# Patient Record
Sex: FEMALE | Race: WHITE | NOT HISPANIC OR LATINO | Employment: OTHER | URBAN - METROPOLITAN AREA
[De-identification: names, ages, dates, MRNs, and addresses within clinical notes are randomized per-mention and may not be internally consistent; named-entity substitution may affect disease eponyms.]

---

## 2018-01-10 ENCOUNTER — HOSPITAL ENCOUNTER (OUTPATIENT)
Facility: MEDICAL CENTER | Age: 83
End: 2018-01-12
Attending: EMERGENCY MEDICINE | Admitting: INTERNAL MEDICINE
Payer: COMMERCIAL

## 2018-01-10 ENCOUNTER — APPOINTMENT (OUTPATIENT)
Dept: RADIOLOGY | Facility: MEDICAL CENTER | Age: 83
End: 2018-01-10
Attending: EMERGENCY MEDICINE
Payer: COMMERCIAL

## 2018-01-10 DIAGNOSIS — F03.90 DEMENTIA WITHOUT BEHAVIORAL DISTURBANCE, UNSPECIFIED DEMENTIA TYPE: ICD-10-CM

## 2018-01-10 DIAGNOSIS — R53.1 WEAKNESS: ICD-10-CM

## 2018-01-10 DIAGNOSIS — I48.21 PERMANENT ATRIAL FIBRILLATION (HCC): ICD-10-CM
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
## 2018-01-10 DIAGNOSIS — R42 VERTIGO: ICD-10-CM

## 2018-01-10 DIAGNOSIS — R53.1 GENERALIZED WEAKNESS: ICD-10-CM

## 2018-01-10 LAB
ALBUMIN SERPL BCP-MCNC: 3.6 G/DL (ref 3.2–4.9)
ALBUMIN/GLOB SERPL: 1 G/DL
ALP SERPL-CCNC: 84 U/L (ref 30–99)
ALT SERPL-CCNC: 16 U/L (ref 2–50)
ANION GAP SERPL CALC-SCNC: 11 MMOL/L (ref 0–11.9)
AST SERPL-CCNC: 33 U/L (ref 12–45)
BASOPHILS # BLD AUTO: 0.7 % (ref 0–1.8)
BASOPHILS # BLD: 0.03 K/UL (ref 0–0.12)
BILIRUB SERPL-MCNC: 0.5 MG/DL (ref 0.1–1.5)
BUN SERPL-MCNC: 18 MG/DL (ref 8–22)
CALCIUM SERPL-MCNC: 9 MG/DL (ref 8.5–10.5)
CHLORIDE SERPL-SCNC: 104 MMOL/L (ref 96–112)
CO2 SERPL-SCNC: 20 MMOL/L (ref 20–33)
CREAT SERPL-MCNC: 0.72 MG/DL (ref 0.5–1.4)
EOSINOPHIL # BLD AUTO: 0 K/UL (ref 0–0.51)
EOSINOPHIL NFR BLD: 0 % (ref 0–6.9)
ERYTHROCYTE [DISTWIDTH] IN BLOOD BY AUTOMATED COUNT: 45.6 FL (ref 35.9–50)
GLOBULIN SER CALC-MCNC: 3.6 G/DL (ref 1.9–3.5)
GLUCOSE SERPL-MCNC: 98 MG/DL (ref 65–99)
HCT VFR BLD AUTO: 45.9 % (ref 37–47)
HGB BLD-MCNC: 15.4 G/DL (ref 12–16)
IMM GRANULOCYTES # BLD AUTO: 0.01 K/UL (ref 0–0.11)
IMM GRANULOCYTES NFR BLD AUTO: 0.2 % (ref 0–0.9)
LIPASE SERPL-CCNC: 28 U/L (ref 11–82)
LYMPHOCYTES # BLD AUTO: 0.72 K/UL (ref 1–4.8)
LYMPHOCYTES NFR BLD: 17.4 % (ref 22–41)
MCH RBC QN AUTO: 30.7 PG (ref 27–33)
MCHC RBC AUTO-ENTMCNC: 33.6 G/DL (ref 33.6–35)
MCV RBC AUTO: 91.4 FL (ref 81.4–97.8)
MONOCYTES # BLD AUTO: 0.77 K/UL (ref 0–0.85)
MONOCYTES NFR BLD AUTO: 18.6 % (ref 0–13.4)
NEUTROPHILS # BLD AUTO: 2.6 K/UL (ref 2–7.15)
NEUTROPHILS NFR BLD: 63.1 % (ref 44–72)
NRBC # BLD AUTO: 0 K/UL
NRBC BLD-RTO: 0 /100 WBC
PLATELET # BLD AUTO: 172 K/UL (ref 164–446)
PMV BLD AUTO: 11.3 FL (ref 9–12.9)
POTASSIUM SERPL-SCNC: 4.1 MMOL/L (ref 3.6–5.5)
PROT SERPL-MCNC: 7.2 G/DL (ref 6–8.2)
RBC # BLD AUTO: 5.02 M/UL (ref 4.2–5.4)
SODIUM SERPL-SCNC: 135 MMOL/L (ref 135–145)
WBC # BLD AUTO: 4.1 K/UL (ref 4.8–10.8)

## 2018-01-10 PROCEDURE — 83690 ASSAY OF LIPASE: CPT

## 2018-01-10 PROCEDURE — 85025 COMPLETE CBC W/AUTO DIFF WBC: CPT

## 2018-01-10 PROCEDURE — 84484 ASSAY OF TROPONIN QUANT: CPT

## 2018-01-10 PROCEDURE — 93005 ELECTROCARDIOGRAM TRACING: CPT | Performed by: EMERGENCY MEDICINE

## 2018-01-10 PROCEDURE — 80053 COMPREHEN METABOLIC PANEL: CPT

## 2018-01-10 PROCEDURE — 99285 EMERGENCY DEPT VISIT HI MDM: CPT

## 2018-01-10 PROCEDURE — 84443 ASSAY THYROID STIM HORMONE: CPT

## 2018-01-10 PROCEDURE — 83735 ASSAY OF MAGNESIUM: CPT

## 2018-01-10 PROCEDURE — 83880 ASSAY OF NATRIURETIC PEPTIDE: CPT

## 2018-01-10 ASSESSMENT — PAIN SCALES - GENERAL: PAINLEVEL_OUTOF10: 9

## 2018-01-11 ENCOUNTER — APPOINTMENT (OUTPATIENT)
Dept: RADIOLOGY | Facility: MEDICAL CENTER | Age: 83
End: 2018-01-11
Attending: EMERGENCY MEDICINE
Payer: COMMERCIAL

## 2018-01-11 ENCOUNTER — RESOLUTE PROFESSIONAL BILLING HOSPITAL PROF FEE (OUTPATIENT)
Dept: HOSPITALIST | Facility: MEDICAL CENTER | Age: 83
End: 2018-01-11
Payer: COMMERCIAL

## 2018-01-11 PROBLEM — H55.00 NYSTAGMUS: Status: ACTIVE | Noted: 2018-01-11
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
## 2018-01-11 PROBLEM — I48.20 CHRONIC ATRIAL FIBRILLATION (HCC): Status: ACTIVE | Noted: 2018-01-11

## 2018-01-11 PROBLEM — R82.71 ASYMPTOMATIC BACTERIURIA: Status: ACTIVE | Noted: 2018-01-11

## 2018-01-11 PROBLEM — R42 DIZZINESS: Status: ACTIVE | Noted: 2018-01-11
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
## 2018-01-11 PROBLEM — I48.20 CHRONIC ATRIAL FIBRILLATION (HCC): Chronic | Status: ACTIVE | Noted: 2018-01-11

## 2018-01-11 PROBLEM — R29.6 MULTIPLE FALLS: Status: ACTIVE | Noted: 2018-01-11

## 2018-01-11 PROBLEM — F03.90 DEMENTIA (HCC): Status: ACTIVE | Noted: 2018-01-11

## 2018-01-11 PROBLEM — R53.1 GENERALIZED WEAKNESS: Status: ACTIVE | Noted: 2018-01-11

## 2018-01-11 LAB
APPEARANCE UR: ABNORMAL
BACTERIA #/AREA URNS HPF: ABNORMAL /HPF
BILIRUB UR QL STRIP.AUTO: NEGATIVE
BNP SERPL-MCNC: 171 PG/ML (ref 0–100)
COLOR UR: ABNORMAL
EPI CELLS #/AREA URNS HPF: ABNORMAL /HPF
GLUCOSE UR STRIP.AUTO-MCNC: NEGATIVE MG/DL
HYALINE CASTS #/AREA URNS LPF: ABNORMAL /LPF
KETONES UR STRIP.AUTO-MCNC: 15 MG/DL
LEUKOCYTE ESTERASE UR QL STRIP.AUTO: ABNORMAL
MAGNESIUM SERPL-MCNC: 2 MG/DL (ref 1.5–2.5)
MICRO URNS: ABNORMAL
MUCOUS THREADS #/AREA URNS HPF: ABNORMAL /HPF
NITRITE UR QL STRIP.AUTO: NEGATIVE
PH UR STRIP.AUTO: 5 [PH]
PROT UR QL STRIP: NEGATIVE MG/DL
RBC # URNS HPF: ABNORMAL /HPF
RBC UR QL AUTO: NEGATIVE
SP GR UR STRIP.AUTO: 1.03
TROPONIN I SERPL-MCNC: 0.02 NG/ML (ref 0–0.04)
TSH SERPL DL<=0.005 MIU/L-ACNC: 1.97 UIU/ML (ref 0.38–5.33)
UROBILINOGEN UR STRIP.AUTO-MCNC: 0.2 MG/DL
WBC #/AREA URNS HPF: ABNORMAL /HPF

## 2018-01-11 PROCEDURE — 700101 HCHG RX REV CODE 250: Performed by: STUDENT IN AN ORGANIZED HEALTH CARE EDUCATION/TRAINING PROGRAM

## 2018-01-11 PROCEDURE — 700105 HCHG RX REV CODE 258: Performed by: STUDENT IN AN ORGANIZED HEALTH CARE EDUCATION/TRAINING PROGRAM

## 2018-01-11 PROCEDURE — 36415 COLL VENOUS BLD VENIPUNCTURE: CPT

## 2018-01-11 PROCEDURE — 94640 AIRWAY INHALATION TREATMENT: CPT

## 2018-01-11 PROCEDURE — 97162 PT EVAL MOD COMPLEX 30 MIN: CPT

## 2018-01-11 PROCEDURE — 71045 X-RAY EXAM CHEST 1 VIEW: CPT

## 2018-01-11 PROCEDURE — G0378 HOSPITAL OBSERVATION PER HR: HCPCS

## 2018-01-11 PROCEDURE — 94760 N-INVAS EAR/PLS OXIMETRY 1: CPT

## 2018-01-11 PROCEDURE — 70450 CT HEAD/BRAIN W/O DYE: CPT

## 2018-01-11 PROCEDURE — 700102 HCHG RX REV CODE 250 W/ 637 OVERRIDE(OP): Performed by: STUDENT IN AN ORGANIZED HEALTH CARE EDUCATION/TRAINING PROGRAM

## 2018-01-11 PROCEDURE — 700111 HCHG RX REV CODE 636 W/ 250 OVERRIDE (IP): Performed by: STUDENT IN AN ORGANIZED HEALTH CARE EDUCATION/TRAINING PROGRAM

## 2018-01-11 PROCEDURE — G8978 MOBILITY CURRENT STATUS: HCPCS | Mod: CJ

## 2018-01-11 PROCEDURE — A9270 NON-COVERED ITEM OR SERVICE: HCPCS | Performed by: STUDENT IN AN ORGANIZED HEALTH CARE EDUCATION/TRAINING PROGRAM

## 2018-01-11 PROCEDURE — G8979 MOBILITY GOAL STATUS: HCPCS | Mod: CI

## 2018-01-11 PROCEDURE — 81001 URINALYSIS AUTO W/SCOPE: CPT

## 2018-01-11 PROCEDURE — 99220 PR INITIAL OBSERVATION CARE,LEVL III: CPT | Mod: GC | Performed by: INTERNAL MEDICINE

## 2018-01-11 RX ORDER — ACETAMINOPHEN 325 MG/1
650 TABLET ORAL EVERY 6 HOURS PRN
Status: DISCONTINUED | OUTPATIENT
Start: 2018-01-11 | End: 2018-01-12 | Stop reason: HOSPADM

## 2018-01-11 RX ORDER — SODIUM CHLORIDE 9 MG/ML
INJECTION, SOLUTION INTRAVENOUS CONTINUOUS
Status: DISCONTINUED | OUTPATIENT
Start: 2018-01-11 | End: 2018-01-12

## 2018-01-11 RX ORDER — AMOXICILLIN 250 MG
2 CAPSULE ORAL 2 TIMES DAILY
Status: DISCONTINUED | OUTPATIENT
Start: 2018-01-11 | End: 2018-01-12 | Stop reason: HOSPADM

## 2018-01-11 RX ORDER — BISACODYL 10 MG
10 SUPPOSITORY, RECTAL RECTAL
Status: DISCONTINUED | OUTPATIENT
Start: 2018-01-11 | End: 2018-01-12 | Stop reason: HOSPADM

## 2018-01-11 RX ORDER — IPRATROPIUM BROMIDE AND ALBUTEROL SULFATE 2.5; .5 MG/3ML; MG/3ML
3 SOLUTION RESPIRATORY (INHALATION)
Status: DISCONTINUED | OUTPATIENT
Start: 2018-01-11 | End: 2018-01-12

## 2018-01-11 RX ORDER — ONDANSETRON 4 MG/1
4 TABLET, ORALLY DISINTEGRATING ORAL EVERY 4 HOURS PRN
Status: DISCONTINUED | OUTPATIENT
Start: 2018-01-11 | End: 2018-01-12 | Stop reason: HOSPADM

## 2018-01-11 RX ORDER — ASPIRIN 81 MG/1
324 TABLET, CHEWABLE ORAL DAILY
Status: DISCONTINUED | OUTPATIENT
Start: 2018-01-11 | End: 2018-01-12 | Stop reason: HOSPADM

## 2018-01-11 RX ORDER — POLYETHYLENE GLYCOL 3350 17 G/17G
1 POWDER, FOR SOLUTION ORAL
Status: DISCONTINUED | OUTPATIENT
Start: 2018-01-11 | End: 2018-01-12 | Stop reason: HOSPADM

## 2018-01-11 RX ORDER — POLYETHYLENE GLYCOL 3350 17 G/17G
1 POWDER, FOR SOLUTION ORAL ONCE
Status: DISPENSED | OUTPATIENT
Start: 2018-01-11 | End: 2018-01-12

## 2018-01-11 RX ORDER — ASPIRIN 81 MG/1
81 TABLET, CHEWABLE ORAL DAILY
Status: DISCONTINUED | OUTPATIENT
Start: 2018-01-11 | End: 2018-01-11

## 2018-01-11 RX ADMIN — ONDANSETRON 4 MG: 4 TABLET, ORALLY DISINTEGRATING ORAL at 20:34

## 2018-01-11 RX ADMIN — IPRATROPIUM BROMIDE AND ALBUTEROL SULFATE 3 ML: .5; 3 SOLUTION RESPIRATORY (INHALATION) at 20:00

## 2018-01-11 RX ADMIN — ACETAMINOPHEN 650 MG: 325 TABLET, FILM COATED ORAL at 20:33

## 2018-01-11 RX ADMIN — SODIUM CHLORIDE: 9 INJECTION, SOLUTION INTRAVENOUS at 04:53

## 2018-01-11 RX ADMIN — SODIUM CHLORIDE: 9 INJECTION, SOLUTION INTRAVENOUS at 22:11

## 2018-01-11 RX ADMIN — ASPIRIN 324 MG: 81 TABLET, CHEWABLE ORAL at 08:13

## 2018-01-11 ASSESSMENT — ENCOUNTER SYMPTOMS
DOUBLE VISION: 0
DEPRESSION: 0
BACK PAIN: 0
DIZZINESS: 0
BLURRED VISION: 0
CHILLS: 0
ABDOMINAL PAIN: 0
SHORTNESS OF BREATH: 0
FEVER: 0
COUGH: 1
SPUTUM PRODUCTION: 1
NAUSEA: 0
FOCAL WEAKNESS: 0
SHORTNESS OF BREATH: 1
DIZZINESS: 1
VOMITING: 0
HEADACHES: 0
STRIDOR: 0
PHOTOPHOBIA: 0
NERVOUS/ANXIOUS: 0
SORE THROAT: 0
MEMORY LOSS: 1
PALPITATIONS: 0
NECK PAIN: 0
MYALGIAS: 0
COUGH: 0
WEAKNESS: 0
HEMOPTYSIS: 0
FALLS: 1
DIARRHEA: 0
SPUTUM PRODUCTION: 0
CONSTIPATION: 1

## 2018-01-11 ASSESSMENT — LIFESTYLE VARIABLES
HAVE YOU EVER FELT YOU SHOULD CUT DOWN ON YOUR DRINKING: NO
HAVE PEOPLE ANNOYED YOU BY CRITICIZING YOUR DRINKING: NO
EVER HAD A DRINK FIRST THING IN THE MORNING TO STEADY YOUR NERVES TO GET RID OF A HANGOVER: NO
ON A TYPICAL DAY WHEN YOU DRINK ALCOHOL HOW MANY DRINKS DO YOU HAVE: 1
AVERAGE NUMBER OF DAYS PER WEEK YOU HAVE A DRINK CONTAINING ALCOHOL: 0
TOTAL SCORE: 0
CONSUMPTION TOTAL: NEGATIVE
EVER_SMOKED: YES
TOTAL SCORE: 0
HOW MANY TIMES IN THE PAST YEAR HAVE YOU HAD 5 OR MORE DRINKS IN A DAY: 0
TOTAL SCORE: 0
ALCOHOL_USE: YES
EVER FELT BAD OR GUILTY ABOUT YOUR DRINKING: NO
EVER_SMOKED: YES

## 2018-01-11 ASSESSMENT — COPD QUESTIONNAIRES
COPD SCREENING SCORE: 4
DURING THE PAST 4 WEEKS HOW MUCH DID YOU FEEL SHORT OF BREATH: NONE/LITTLE OF THE TIME
DURING THE PAST 4 WEEKS HOW MUCH DID YOU FEEL SHORT OF BREATH: NONE/LITTLE OF THE TIME
HAVE YOU SMOKED AT LEAST 100 CIGARETTES IN YOUR ENTIRE LIFE: NO/DON'T KNOW
HAVE YOU SMOKED AT LEAST 100 CIGARETTES IN YOUR ENTIRE LIFE: YES
DO YOU EVER COUGH UP ANY MUCUS OR PHLEGM?: NO/ONLY WITH OCCASIONAL COLDS OR INFECTIONS
DO YOU EVER COUGH UP ANY MUCUS OR PHLEGM?: NO/ONLY WITH OCCASIONAL COLDS OR INFECTIONS
COPD SCREENING SCORE: 2

## 2018-01-11 ASSESSMENT — COGNITIVE AND FUNCTIONAL STATUS - GENERAL
MOBILITY SCORE: 16
STANDING UP FROM CHAIR USING ARMS: A LOT
MOVING FROM LYING ON BACK TO SITTING ON SIDE OF FLAT BED: A LITTLE
TURNING FROM BACK TO SIDE WHILE IN FLAT BAD: A LITTLE
MOVING TO AND FROM BED TO CHAIR: A LITTLE
SUGGESTED CMS G CODE MODIFIER MOBILITY: CK
CLIMB 3 TO 5 STEPS WITH RAILING: A LOT
WALKING IN HOSPITAL ROOM: A LITTLE

## 2018-01-11 ASSESSMENT — PATIENT HEALTH QUESTIONNAIRE - PHQ9
1. LITTLE INTEREST OR PLEASURE IN DOING THINGS: NOT AT ALL
SUM OF ALL RESPONSES TO PHQ QUESTIONS 1-9: 0
2. FEELING DOWN, DEPRESSED, IRRITABLE, OR HOPELESS: NOT AT ALL
SUM OF ALL RESPONSES TO PHQ9 QUESTIONS 1 AND 2: 0

## 2018-01-11 ASSESSMENT — GAIT ASSESSMENTS
DISTANCE (FEET): 65
ASSISTIVE DEVICE: FRONT WHEEL WALKER
GAIT LEVEL OF ASSIST: CONTACT GUARD ASSIST
DEVIATION: DECREASED BASE OF SUPPORT;STEP TO;DECREASED HEEL STRIKE;DECREASED TOE OFF

## 2018-01-11 ASSESSMENT — PAIN SCALES - GENERAL
PAINLEVEL_OUTOF10: 0
PAINLEVEL_OUTOF10: 0
PAINLEVEL_OUTOF10: 3

## 2018-01-11 NOTE — CARE PLAN
Problem: Safety  Goal: Will remain free from falls  Outcome: PROGRESSING AS EXPECTED    Intervention: Assess risk factors for falls  Safety education was given to patient.       Problem: Venous Thromboembolism (VTW)/Deep Vein Thrombosis (DVT) Prevention:  Goal: Patient will participate in Venous Thrombosis (VTE)/Deep Vein Thrombosis (DVT)Prevention Measures  Outcome: PROGRESSING SLOWER THAN EXPECTED    Intervention: Assess and monitor for anticoagulation complications  Nurse provided education about her anticoagulations and the risk of not taking medication. Patient cont to refuse meds.

## 2018-01-11 NOTE — ED NOTES
"Chief Complaint   Patient presents with   • Nausea/Vomiting/Diarrhea     \"I've been vomitting the last 2 days, my bowels keep moving. I hurt all over. I'm very dizzy and it's hard to walk. I'm so afraid of falling\". Denies fevers. Denies abd pain.    • Dizziness     Pt amb to triage with 2 person assist, unsteady. Placed in w/c. Reporting cough x1 week. Mask applied. Main concern is dizziness, \"I can't stop spinning\".   NAD noted. VSS. No emesis in triage. Emesis bag provided.   Pt returned to [facility]. Educated on triage process and to inform staff of any changes.     /74   Pulse 95   Temp 36.8 °C (98.2 °F) (Temporal)   Resp 16   Ht 1.676 m (5' 6\")   Wt 75.7 kg (166 lb 14.2 oz)   SpO2 94%   BMI 26.94 kg/m²     "

## 2018-01-11 NOTE — THERAPY
"Physical Therapy Evaluation completed.   Bed Mobility:  Supine to Sit: Minimal Assist  Transfers: Sit to Stand: Contact Guard Assist  Gait: Level Of Assist: Contact Guard Assist with Front-Wheel Walker       Plan of Care: Will benefit from Physical Therapy 3 times per week  Discharge Recommendations: Equipment: Front-Wheel Walker. Post-acute therapy Discharge to a transitional care facility for continued skilled therapy services. and Discharge to home with outpatient or home health for additional skilled therapy services.    See \"Rehab Therapy-Acute\" Patient Summary Report for complete documentation.     Pt. presented to PT for primary risk reduction for LOB/falling. Pt. presented with imparied balance, imparied gait, decreased strength, poor saftey awareness, and dec activity tolerance. Pt. was able to demonstrate Min A for bed mobility with HOB flat and rails down, CGA for ambulation w/FWW. Pt. demonstrates with an unsteady gait pattern and tends to do worse when ambulating and talking at the same time. Requires focus/attention for balance. Demonstrates with poor saftey awareness as pt. tends to leave AD behind and perfrom furniture walking for transfers. Pt. will benefit from continued skilled PT while in house. Recommend post acute rehab given current objective findings, age, IPLOF, enviornemental barries, and limited social support at this time. However, if pt. makes signifcant progress in balance and gait mechanics while in house pt. may d/c back to assisted living facility if medically approrpiate.   "

## 2018-01-11 NOTE — SENIOR ADMIT NOTE
Ms. yLons is a 86 y.o. female with PMHx of afib on ASA who was brought in to the ER by her son for an episode of nausea, vomiting, and diarrhea that occurred Monday morning.  She had gone out to dinner with her son on Sunday, and on Monday called him because she threw up once and had one large bowel movement that was not as firm as it usually is, though patient adamantly denies this was diarrhea.  She denies any symptoms since then and is tolerating po intake normally now.  She denies fever, chills, abdominal pain, dysuria, chest pain, palpitations, dyspnea.  She does report that she is worried about this dizziness that has started over the past month or so.  She feels that the dizziness occurs usually when she moves from sitting to standing position, and if she stands still for a few seconds it gets a little better but does not resolve completely.  She does not have this while lying down at all.  She uses a walking stick to ambulate and is usually very careful.  She denies any weakness.  She has had 2 mechanical falls in the past month or so, one when she reached down to pet a dog and lost her  on her cane, and the other when she accidentally missed the bed as she was trying to sit on the edge of it.  She has a little residual R shoulder pain from the initial fall a month ago, but no limitation of motion.  She still has a little bruising over her L arm and chest from the most recent fall last week, but states this has improved significantly as well.  She states she is usually very careful and does not recall having had any other falls in the past year.  She is a little concerned that she might have a fall with this dizziness however.  She drinks about one small bottle of water per day.  Her only med is an ASA daily for her Afib.  She has not had a PCP since she retired but is interested in getting a new geriatrician (though she would like it to be a female who is older than 50).  She lives in a place where  "they make meals for her and she does not have to climb any stairs in her house.    Exam:  /74   Pulse 79   Temp 36.8 °C (98.2 °F) (Temporal)   Resp (!) 21   Ht 1.676 m (5' 6\")   Wt 75.7 kg (166 lb 14.2 oz)   SpO2 91%   BMI 26.94 kg/m²     - no acute distress  - alert and oriented; strength 5/5 in all extremities; cerebellar function intact; cranial nerves 2-12 grossly intact; + mild horizontal nystagmus in both eyes with lateral gaze  - patient answers all questions appropriately and is able to provide a thorough and accurate history  - irregular rhythm, normal rate; no m/r/g  - CTABL, no rales/ronchi  - orthostatic vitals negative  - abd soft, nontender, BS+  - skin intact; + bruising of skin over L triceps and L chest wall    Labs:  Recent Results (from the past 24 hour(s))   CBC WITH DIFFERENTIAL    Collection Time: 01/10/18  9:48 PM   Result Value Ref Range    WBC 4.1 (L) 4.8 - 10.8 K/uL    RBC 5.02 4.20 - 5.40 M/uL    Hemoglobin 15.4 12.0 - 16.0 g/dL    Hematocrit 45.9 37.0 - 47.0 %    MCV 91.4 81.4 - 97.8 fL    MCH 30.7 27.0 - 33.0 pg    MCHC 33.6 33.6 - 35.0 g/dL    RDW 45.6 35.9 - 50.0 fL    Platelet Count 172 164 - 446 K/uL    MPV 11.3 9.0 - 12.9 fL    Neutrophils-Polys 63.10 44.00 - 72.00 %    Lymphocytes 17.40 (L) 22.00 - 41.00 %    Monocytes 18.60 (H) 0.00 - 13.40 %    Eosinophils 0.00 0.00 - 6.90 %    Basophils 0.70 0.00 - 1.80 %    Immature Granulocytes 0.20 0.00 - 0.90 %    Nucleated RBC 0.00 /100 WBC    Neutrophils (Absolute) 2.60 2.00 - 7.15 K/uL    Lymphs (Absolute) 0.72 (L) 1.00 - 4.80 K/uL    Monos (Absolute) 0.77 0.00 - 0.85 K/uL    Eos (Absolute) 0.00 0.00 - 0.51 K/uL    Baso (Absolute) 0.03 0.00 - 0.12 K/uL    Immature Granulocytes (abs) 0.01 0.00 - 0.11 K/uL    NRBC (Absolute) 0.00 K/uL   COMP METABOLIC PANEL    Collection Time: 01/10/18  9:48 PM   Result Value Ref Range    Sodium 135 135 - 145 mmol/L    Potassium 4.1 3.6 - 5.5 mmol/L    Chloride 104 96 - 112 mmol/L    Co2 20 20 " - 33 mmol/L    Anion Gap 11.0 0.0 - 11.9    Glucose 98 65 - 99 mg/dL    Bun 18 8 - 22 mg/dL    Creatinine 0.72 0.50 - 1.40 mg/dL    Calcium 9.0 8.5 - 10.5 mg/dL    AST(SGOT) 33 12 - 45 U/L    ALT(SGPT) 16 2 - 50 U/L    Alkaline Phosphatase 84 30 - 99 U/L    Total Bilirubin 0.5 0.1 - 1.5 mg/dL    Albumin 3.6 3.2 - 4.9 g/dL    Total Protein 7.2 6.0 - 8.2 g/dL    Globulin 3.6 (H) 1.9 - 3.5 g/dL    A-G Ratio 1.0 g/dL   LIPASE    Collection Time: 01/10/18  9:48 PM   Result Value Ref Range    Lipase 28 11 - 82 U/L   ESTIMATED GFR    Collection Time: 01/10/18  9:48 PM   Result Value Ref Range    GFR If African American >60 >60 mL/min/1.73 m 2    GFR If Non African American >60 >60 mL/min/1.73 m 2   TSH    Collection Time: 01/10/18  9:48 PM   Result Value Ref Range    TSH 1.970 0.380 - 5.330 uIU/mL   BNP    Collection Time: 01/10/18  9:48 PM   Result Value Ref Range    B Natriuretic Peptide 171 (H) 0 - 100 pg/mL   EKG    Collection Time: 01/10/18 11:53 PM   Result Value Ref Range    Report       [facility].    Test Date:  2018-01-10  Pt Name:    ORTIZ GARCIA                  Department: ER  MRN:        [id number]                      Room:       [facility]  Gender:     Female                       Technician: [id number]  :        [id number]                   Requested By:PRAKASH MCKOY  Order #:    [id number]                    Reading MD:    Measurements  Intervals                                Axis  Rate:       80                           P:  UT:                                      QRS:        33  QRSD:       76                           T:          8  QT:         392  QTc:        453    Interpretive Statements  ATRIAL FIBRILLATION  VENTRICULAR PREMATURE COMPLEX  Compared to ECG 2010 23:55:26  Ventricular premature complex(es) now present         CT-HEAD W/O   Final Result         1. No acute intracranial findings. No evidence of acute intracranial hemorrhage or mass lesion.      2. White  matter lucencies most consistent with chronic small vessel ischemic change.               DX-CHEST-LIMITED (1 VIEW)   Final Result         Mild diffuse interstitial prominence could relate to chronic changes or mild fluid overload.      Stable cardiomegaly.          Assessment and Plan:    DIZZINESS WITH NYSTAGMUS  GROUND LEVEL FALLS  DEHYDRATION  ASYMPTOMATIC BACTERURIA  CHRONIC ATRIAL FIBRILLATION  - ER workup revealed normal CBC, CMP, Mg, lipase, trop, and TSH  - CXR clear, CT head shows no acute abnormality; EGK shows rate-controlled afib  - BNP mildly elevated, but patient otherwise has no e/o fluid overload  - UA shows e/o dehydration (ketones, hyaline casts) and shows moderate LE with  wbc, but patient denies any UTI symptoms  - she does have mild nystagmus on exam  - orthostatic vitals neg, but symptoms seem orthostatic per history  - will admit to obs for IVF hydration  - have encouraged patient to maintain better hydration at home and stand still for some time after getting up before ambulating  - will get PT/OT eval to r/o BPPV while inpatient (though less likely as symptoms done appear to be vetiginous) and suggest d/c with home health PT/OT for home safety assessment and strengthening exercises  - if symptoms still persist, can consider MRI brain to r/o cerebellar stroke, but seems unlikely; also patient has been having these symptoms for weeks now so any infarct should be visible on CT head, which was clear  - discussed OAC with patient due to chadsvasc score of 3; she seems agreeable, but for now will continue  until this can be discussed once again with son present and after fall risk assessment by PT/OT  - suggest providing patient info for [facility] on discharge so she can find a geriatrician to establish with    For further details of Assessment & Plan, please refer to H&P by Dr. Munoz from 1/11/2018.    CODE STATUS: full  DVT PROPHYLAXIS: jurgenx    Whit Torres  MD

## 2018-01-11 NOTE — ED PROVIDER NOTES
"ED Provider Note    ER PROVIDER NOTE        CHIEF COMPLAINT  Chief Complaint   Patient presents with   • Nausea/Vomiting/Diarrhea     \"I've been vomitting the last 2 days, my bowels keep moving. I hurt all over. I'm very dizzy and it's hard to walk. I'm so afraid of falling\". Denies fevers. Denies abd pain.    • Dizziness       HPI  Sindy Lyons is a 86 y.o. female who presents to the emergency department complaining of weakness and dizziness. Patient reports she's had some generalized weakness although this is over several months, does seem to be worse in the last week and is now reporting some dizziness. She's describes the dizziness which she is a hard time qualifying, somewhere between lightheadedness and a sensation of off balance.  She has had several falls in the last month as well. Yesterday she went to the bathroom, felt nauseated had an episode of emesis as well as diarrhea although she has not had any today. She does endorse some urinary frequency as well over the last week.  Denies any fevers or chills. No chest pain or cough. Has not seen a doctor in many years    REVIEW OF SYSTEMS  Pertinent positives include dizziness. Pertinent negatives include no fever. See HPI for details. All other systems reviewed and are negative.    PAST MEDICAL HISTORY   has a past medical history of Afib (CMS-HCC) and CATARACT.    SURGICAL HISTORY   has a past surgical history that includes other abdominal surgery; gyn surgery; and other.    FAMILY HISTORY  History reviewed. No pertinent family history.    SOCIAL HISTORY  Social History     Social History   • Marital status: Single     Spouse name: N/A   • Number of children: N/A   • Years of education: N/A     Social History Main Topics   • Smoking status: Former Smoker     Years: 5.00   • Smokeless tobacco: Former User      Comment: 25 years ago   • Alcohol use No   • Drug use: No   • Sexual activity: Not on file     Other Topics Concern   • Not on file     Social History " "Narrative    Resides in assisted living facility.       History   Drug Use No       CURRENT MEDICATIONS  Home Medications    **Home medications have not yet been reviewed for this encounter**         ALLERGIES  No Known Allergies    PHYSICAL EXAM  VITAL SIGNS: /74   Pulse 71   Temp 36.8 °C (98.2 °F) (Temporal)   Resp 20   Ht 1.676 m (5' 6\")   Wt 75.7 kg (166 lb 14.2 oz)   SpO2 93%   BMI 26.94 kg/m²   Pulse ox interpretation: I interpret this pulse ox as normal.    Constitutional: Alert in no apparent distress.  HENT: No signs of trauma, Bilateral external ears normal, Nose normal.   Eyes: Pupils are equal and reactive, Conjunctiva normal, Non-icteric.   Neck: Normal range of motion, No tenderness, Supple, No stridor.   Lymphatic: No lymphadenopathy noted.   Cardiovascular: Regular rate and rhythm, no murmurs.   Thorax & Lungs: Normal breath sounds, No respiratory distress, No wheezing, No chest tenderness.   Abdomen: Bowel sounds normal, Soft, No tenderness, No masses, No pulsatile masses. No peritoneal signs.  Skin: Warm, Dry, No erythema, No rash.   Back: No bony tenderness, No CVA tenderness.   Extremities: Intact distal pulses, No edema, No tenderness, No cyanosis, Negative John's sign.  Musculoskeletal: Good range of motion in all major joints. No tenderness to palpation or major deformities noted.   Neurologic: Alert, bilateral horizontal nystagmus cranial nerves intact, speech is appropriate or not slurred, upper extremities bilaterally exhibit no drift, no dysmetria, 5 out of 5 strength with bilateral bicep/tricep/, sensation intact to light touch throughout upper extremities. Lower extremities strength 5 out of 5 thigh extension/flexion/abduction/adduction, knee extension/flexion, dorsiflexion plantar flexion. No clonus.  2+ patella reflexes.  sensation intact to light touch.  No focal deficits noted. Unsteady gait, somewhat repetitive in questioning  Psychiatric: Affect normal, Judgment " normal, Mood normal.        DIAGNOSTIC STUDIES / PROCEDURES    EKG  Interpreted by me    Rhythm: Atrial fibrillation  Rate: 80  Axis: normal  Intervals: normal  Ectopy: Isolated PVC   Conduction: normal  ST Segments: no acute change  T Waves: no acute change  Q Waves: none      LABS  Labs Reviewed   CBC WITH DIFFERENTIAL - Abnormal; Notable for the following:        Result Value    WBC 4.1 (*)     Lymphocytes 17.40 (*)     Monocytes 18.60 (*)     Lymphs (Absolute) 0.72 (*)     All other components within normal limits   COMP METABOLIC PANEL - Abnormal; Notable for the following:     Globulin 3.6 (*)     All other components within normal limits   BTYPE NATRIURETIC PEPTIDE - Abnormal; Notable for the following:     B Natriuretic Peptide 171 (*)     All other components within normal limits   LIPASE   ESTIMATED GFR   TROPONIN   TSH   MAGNESIUM   URINALYSIS   POC URINALYSIS       All labs reviewed by me.    RADIOLOGY  CT-HEAD W/O   Final Result         1. No acute intracranial findings. No evidence of acute intracranial hemorrhage or mass lesion.      2. White matter lucencies most consistent with chronic small vessel ischemic change.               DX-CHEST-LIMITED (1 VIEW)   Final Result         Mild diffuse interstitial prominence could relate to chronic changes or mild fluid overload.      Stable cardiomegaly.        The radiologist's interpretation of all radiological studies have been reviewed by me.    COURSE & MEDICAL DECISION MAKING  Nursing notes, VS, PMSFHx reviewed in chart.    11:13 PM Patient seen and examined at bedside. . Ordered for labs, CT, ECG and urinalysis to evaluate her symptoms.     Patient reevaluated, updated on result thus far, she states she has no symptoms at this time    Patient reevaluated, updated on results of ECG as well as CT. The major symptoms and lack of anticoagulation with her age of fibrillation explained concerns and planned admission    Discuss case with [facility] for  admission    Decision Making:  This is a 86 y.o. female presented with some vertiginous symptoms as well as nystagmus. Unclear exact etiology for her symptoms although given her age, nature of his symptoms as well as the fact that she is in atrial fibrillation without anticoagulation do have some concerns for ischemic disease.  She has not seen a doctor in many years and thus I do not feel she is appropriate for outpatient workup of this.  Additionally as she has had multiple falls she may benefit from physical therapy and occupational therapy as well    Patient admitted in stable condition    FINAL IMPRESSION  1. Permanent atrial fibrillation (CMS-HCC)    2. Vertigo    3. Weakness         The note accurately reflects work and decisions made by me.  Chuckie Perry  1/11/2018  3:36 AM

## 2018-01-11 NOTE — DISCHARGE PLANNING
Clinical Note:  This 86 year old female admitted yesterday, 1/10, from the ER with c/o dizziness with standing and weakness.  She had had some nausea, vomiting and diarrhea but that has gone away. She lives at the [facility], here in [place]. She has a son, Nir Luevano ([phone]), who lives in the area.  She is curious to find out why she was dizzy.  Otherwise she said she is feeling somewhat better.  Her discharge plan is to return to the [facility] when she is medically cleared.  She has been independent and has her own apartment there.

## 2018-01-11 NOTE — NON-PROVIDER
Internal Medicine Medical Student Note  Note Author: Luda Teran, Student    Name Sindy Lyons       1931   Age/Sex 86 y.o. female   MRN [id number]   Code Status Full     After 5PM or if no immediate response to page, please call for cross-coverage  Attending/Team: Dr. Kirk See Patient List for primary contact information  Call [phone] to page after hours   1st Call - Day Intern (R1):   Dr. De Leon 2nd Call - Day Sr. Resident (R2/R3):   Dr. Frost     Reason for interval visit  (Principal Problem)   Generalized weakness    Interval Problem Daily Status Update  (24 hours)   Sindy Lyons is a 86 y.o. female w/ hx of a-fib and dementia who presented w/ 1 episode of n/v and diarrhea with dizziness/weakness for one month. She also experienced 2 falls that seemed to be mechanical in the past wk and hurt her shoulder during these. Pt's ADL's include driving, walking w/ a cane, though her meals are prepared by the assisted living facility where she resides. Pt drinks one bottle of H2O per day. Pt was accompanied by her son who provided much of the hx, but he was not present when I interviewed her.    Today pt states that she does not remember being dizzy and does not know why she is in the hospital. She reports a current productive cough, mild dyspnea, and a feeling of being off balance. Pt's last bowel movement was 2 days ago, but she is passing flatus. Denies dysuria, urinary frequency, or urgency.    Review of Systems   Constitutional: Negative for chills and fever.   HENT: Negative for sore throat and tinnitus.    Eyes: Negative for blurred vision.   Respiratory: Positive for cough, sputum production and shortness of breath. Negative for hemoptysis and stridor.    Cardiovascular: Negative for chest pain and palpitations.   Gastrointestinal: Positive for constipation. Negative for abdominal pain, nausea and vomiting.   Genitourinary: Negative for dysuria, frequency and urgency.   Neurological:  Positive for dizziness.     Physical Exam     Vitals:    01/11/18 1143 01/11/18 1145 01/11/18 1146 01/11/18 1148   BP: 123/76 133/72  121/74   Pulse:   74    Resp:   16    Temp:       TempSrc:       SpO2:   95%    Weight:       Height:         Body mass index is 26.9 kg/m². Weight: 75.6 kg (166 lb 10.7 oz)  Oxygen Therapy:  Pulse Oximetry: 95 %, O2 (LPM): 0, O2 Delivery: Nasal Cannula    Physical Exam   Constitutional: She is well-developed, well-nourished, and in no distress. No distress.   HENT:   Head: Normocephalic and atraumatic.   Right Ear: External ear normal.   Left Ear: External ear normal.   Nose: Nose normal.   Mouth/Throat: Oropharynx is clear and moist. Mucous membranes are dry. No oropharyngeal exudate.   Eyes: Conjunctivae are normal. Pupils are equal, round, and reactive to light. Right eye exhibits no discharge. Left eye exhibits no discharge. Right conjunctiva is not injected. Right conjunctiva has no hemorrhage. Left conjunctiva is not injected. Left conjunctiva has no hemorrhage. No scleral icterus. Right eye exhibits nystagmus. Right eye exhibits normal extraocular motion. Left eye exhibits nystagmus. Left eye exhibits normal extraocular motion.   Horizontal nystagmus on lateral gaze b/l that is fatigable w/ rapid onset.  Torsional nystagmus on superior gaze b/l that is fatigable w/ rapid onset.   Neck: Normal range of motion. Neck supple. No JVD present. No tracheal deviation present.   Cardiovascular: Normal rate and intact distal pulses.  An irregular rhythm present. Exam reveals no gallop.    No murmur heard.  Pulmonary/Chest: Effort normal and breath sounds normal. No respiratory distress. She has no wheezes. She has no rales.   Abdominal: Soft. Bowel sounds are normal. She exhibits no distension. There is no tenderness. There is no rebound and no guarding.   Musculoskeletal: Normal range of motion. She exhibits no edema, tenderness or deformity.   Neurological: She is alert. She has  normal sensation, normal strength and intact cranial nerves. She is agitated and disoriented. She displays tremor. She displays no weakness, no atrophy, facial symmetry and normal speech. No cranial nerve deficit or sensory deficit. She exhibits normal muscle tone. She has a normal Cerebellar Exam and a normal Finger-Nose-Finger Test. She shows no pronator drift. Coordination normal. GCS score is 15.   Pt has a mild intention tremor when performing FNF, though she is able to accurately perform the test   Skin: Skin is warm and dry. She is not diaphoretic.   Unable to perform gait exam or jennifer-hallpike since pt became very lightheaded & distressed when sat up and immediately needed to lay back down.    Assessment/Plan   Sindy Lyons is a 86 y.o. female w/ hx of a-fib & dementia who presented w/ 1 episode of n/v and diarrhea w/ one month of dizziness.    # Dizziness/weakness  - Hx of dizziness, but pt denies being dizzy here so was unable to assess qualities of the dizziness  - Likely 2/2 dehydration (presyncopal) vs. Urosepsis vs. BPPV vs. CVA vs. Viral cochleitis or labarynthitis vs. Vestibular neuritis  - Pt had vomiting & diarrhea, which may have led to diarrhea and pt only drinks one bottle of H2O per day  - Worrisome causes of dizziness in elderly pt are urosepsis (since pt also has bacteruria) or CVA (since pt has a-fib and signs of ischemic disease on CT head)  - CT head did not show signs of acute ischemic or hemorrhagic event and pt does not show any focal neuro deficits, making CVA or other central causes of dizziness less likely  - Pt's severe symptoms, such as not being able to sit up w/o becoming dizzy, suggest more of a peripheral cause rather than central cause of her dizziness   Plan:   - PT/OT to further evaluate w/ jennifer-hallpike and gait assessments   - Cont IVF's   - Consider brain MRI if pt does not improve, but it is not indicated at this time   - F/u as outpt w/ geriatrician    # UTI  - UA showed  LE+, few bacteria, hyaline casts, ketones, though pt denies dysuria, urinary freq, or urgency  - Dizziness could be a symptom of a UTI in pt, but must also consider that the abn UA may be due to dehydration/ asymptomatic bacteruria and that the dizziness is unrelated   Plan:   - No antibiotics indicated at this time    # Dehydration  - Dry mucous membranes on exam  - Likely 2/2 decreased H2O intake, possibly 2/2 dementia. Recent n/v & diarrhea may have contributed as well.   Plan:   - Cont IVF's   - Encourage increased PO intake of H2O at home    # Ground level falls  - Pt had 2 falls that seemed to be mechanical in nature  - Likely 2/2 dizziness/weakness that pt is complaining of   Plan:   - PT/OT to assess. Consider a walker, rather than a cane, for added stability.   - Ensure that the pt's living facility has adequate tools to help pt stay free from falls    # Atrial fibrillation  - Chronic, not on anticoagulation at home, but does take ASA  - CHADSVAC=3, indicating need for long-term anticoagulation   Plan:   - Start NOAC to prevent stroke 2/2 embolus from a-fib    # Dementia  - Unsure of what pt's baseline cognitive functioning is, but pt was unsure of her situation, what brought her here, and that she was dizzy   Plan:   - F/u as outpt w/ geriatrician and continue at assisted living facility    Dispo: consider discharge later today if pt does well w/ PT/OT, otherwise keep for another night and reassess tomorrow

## 2018-01-11 NOTE — PROGRESS NOTES
2 RN skin check with Nazia. Generalized dry, flaky, loose skin with scattered moles. Redness to tops of ears, bruising to both arms, redness and excoration to umbilicus, redness under breasts

## 2018-01-11 NOTE — PROGRESS NOTES
Internal Medicine Interval Note  Note Author: Kathi Lindo M.D.     Name Sindy Lyons 1931   Age/Sex 86 y.o. female   MRN [id number]   Code Status Full Code      After 5PM or if no immediate response to page, please call for cross-coverage  Attending/Team: Dr. Kirk/Kaushal See Patient List for primary contact information  Call [phone] to page    1st Call - Day Intern (R1):   Dr. Lindo 2nd Call - Day Sr. Resident (R2/R3):   Dr. Flaherty      Reason for interval visit  (Principal Problem)   Generalized weakness    Interval Problem Daily Status Update  (24 hours)   - Patient stable, states she feels tired, denies lightheadedness/dizziness  - Noted to have nystagmus on physical exam  - MRI/MRA ordered  - PT/OT eval pending, RT protocol for wheezing  - Geriatrics on consult, appreciate rec's      Review of Systems   Constitutional: Negative for chills and fever.   HENT: Positive for congestion. Negative for ear pain, hearing loss and tinnitus.    Eyes: Negative for blurred vision and double vision.   Respiratory: Positive for cough. Negative for sputum production and shortness of breath.    Cardiovascular: Negative for chest pain, palpitations and leg swelling.   Gastrointestinal: Negative for abdominal pain, nausea and vomiting.   Genitourinary: Negative for dysuria and urgency.   Musculoskeletal: Positive for falls. Negative for myalgias and neck pain.   Skin: Negative for rash.   Neurological: Negative for dizziness, focal weakness, weakness and headaches.   Psychiatric/Behavioral: Negative for depression.       Consultants/Specialty  Geriatrics    Disposition  Inpatient    Quality Measures    Reviewed items::  EKG reviewed, Radiology images reviewed, Labs reviewed and Medications reviewed  Shepherd catheter::  No Shepherd  : No central line.  DVT prophylaxis pharmacological::  Enoxaparin (Lovenox)  DVT prophylaxis - mechanical:  SCDs  Ulcer Prophylaxis::  No      Physical Exam     Vitals:     01/11/18 1145 01/11/18 1146 01/11/18 1148 01/11/18 1553   BP: 133/72  121/74 151/70   Pulse:  74  84   Resp:  16  16   Temp:    37 °C (98.6 °F)   TempSrc:       SpO2:  95%  93%   Weight:       Height:         Body mass index is 26.9 kg/m². Weight: 75.6 kg (166 lb 10.7 oz)  Oxygen Therapy:  Pulse Oximetry: 93 %, O2 (LPM): 0, O2 Delivery: Nasal Cannula      Physical Exam   Constitutional: She is oriented to person, place, and time and well-developed, well-nourished, and in no distress.   HENT:   Head: Normocephalic and atraumatic.   Mouth/Throat: No oropharyngeal exudate.   Eyes: Conjunctivae are normal. Pupils are equal, round, and reactive to light. No scleral icterus.   Neck: Normal range of motion. Neck supple. No tracheal deviation present. No thyromegaly present.   Cardiovascular: Normal rate, regular rhythm and normal heart sounds.    Pulmonary/Chest: Effort normal. No respiratory distress. She has wheezes.   Abdominal: Soft. Bowel sounds are normal. She exhibits no distension. There is no tenderness.   Musculoskeletal: Normal range of motion.   Neurological: She is alert and oriented to person, place, and time. No cranial nerve deficit.   Lateral nystagmus noted, fatigability present   Skin: No rash noted. No erythema.   Psychiatric: Affect normal.         Lab Data Review:   1/11/2018  2:42 PM    Recent Labs      01/10/18   0001  01/10/18   2148   SODIUM   --   135   POTASSIUM   --   4.1   CHLORIDE   --   104   CO2   --   20   BUN   --   18   CREATININE   --   0.72   MAGNESIUM  2.0   --    CALCIUM   --   9.0       Recent Labs      01/10/18   2148   ALTSGPT  16   ASTSGOT  33   ALKPHOSPHAT  84   TBILIRUBIN  0.5   LIPASE  28   GLUCOSE  98       Recent Labs      01/10/18   2148   RBC  5.02   HEMOGLOBIN  15.4   HEMATOCRIT  45.9   PLATELETCT  172       Recent Labs      01/10/18   2148   WBC  4.1*   NEUTSPOLYS  63.10   LYMPHOCYTES  17.40*   MONOCYTES  18.60*   EOSINOPHILS  0.00   BASOPHILS  0.70   ASTSGOT  33    ALTSGPT  16   ALKPHOSPHAT  84   TBILIRUBIN  0.5         Assessment/Plan     * Generalized weakness- (present on admission)   Assessment & Plan    - likely due to deconditioning, poor PO intake   - UA suggestive of infection, however denies urinary symptoms    - orthostatics negative  - IVF  - PT/OT        Nystagmus- (present on admission)   Assessment & Plan    - Patient noted to have nystagmus on PE, fatigability noted  - No other focal neurological deficits noted, cranial nerve exam grossly normal  - Likely peripheral nystagmus, however recent history of falls is concerning for central nystagmus  - MRI/MRA ordered        Chronic atrial fibrillation (CMS-HCC)- (present on admission)   Assessment & Plan    - EKG showed atrial fibrillation  - [id number] +3  - Continue asa 325 mg daily   - Will discuss use of OAC, however patient is a poor candidate due to history of frequent falls        History of Falls    Assessment & Plan    - history of mechanical falls, no fractures hx  - CT head negative  - Fall precautions  - PT/OT to eval and treat

## 2018-01-11 NOTE — PROGRESS NOTES
Patient admitted to the floor. Patient AOx4, calm and pleasant. Oriented to unit and room. Plan of care discussed. Admit profile complete. 2 RN skin check done. IV fluids started. UA sent. Denies pain. Call light in use, bed alarm on, treaded socks on, bed locked in low position

## 2018-01-11 NOTE — H&P
Internal Medicine Admitting History and Physical    Note Author: Lorrie Munoz M.D.       Name Sindy Lyons 1931   Age/Sex 86 y.o. female   MRN [id number]   Code Status Full Code      After 5PM or if no immediate response to page, please call for cross-coverage  Attending/Team: Dr. Kirk/Red Team  See Patient List for primary contact information  Call [phone] to page    1st Call - Day Intern (R1):   Dr. Lindo  2nd Call - Day Sr. Resident (R2/R3):   Dr. Frost        Chief Complaint:  Generalized Weakness     HPI:  Sindy Lyons is a very pleasant 86 year old female with a history of chronic atrial fibrillation who presents with generalized weakness. The patient was brought in by her son and history was also obtained from her.     Patient states that she had one episode of vomiting and watery diarrhea on  after lunch with her son that . She has since then had dizziness especially with standing, no vertigo, and generalized weakness. She drinks about 1 bottle water per day, has been eating well. Denies chest pain, shortness of breath, nausea, back pain, lower extremity swelling, nasal congestion, fevers, chills, cough or any recurrence of diarrhea/vomiting. She has not had a bowel movement since Monday, usually has 2-3 per week. Her son was concerned about her dizziness and brought her in for further evaluation. She has a history of 2 recent mechanical falls related to instability while bending over to pet a puppy last week which resulted in fall and left upper arm/flank bruising and also trying to sit on the edge of her bed 2017 resulting in minor fall. Neither falls resulted in fracture. These falls were not accompanied by dizziness or lightheadedness. She resides in assisted living facility and uses cane (she prefers to call it a walking stick) for mobility. Patient has chronic atrial fibrillation not on anticoagulation and takes full dose aspirin daily for many years. Denies  any other medical issues including MI, DM2, HTN or stroke.     ED Course:   Vitals overall wnl, no wbc count, cxr negative, CT head negative, EKG notable for atrial fibrillation, TSH wnl. The patient was subsequently admitted to the floor for generalized weakness/instability due to deconditioning with consult for PT/OT, fluids, evaluation for anticoagulation for atrial fibrillation, and establishing pcp with geriatrician as outpatient.     Review of Systems   Constitutional: Negative for chills, fever and malaise/fatigue.   HENT: Positive for hearing loss. Negative for congestion and sore throat.    Eyes: Negative for blurred vision and photophobia.   Respiratory: Negative for cough, sputum production and shortness of breath.    Cardiovascular: Negative for chest pain, palpitations and leg swelling.   Gastrointestinal: Negative for abdominal pain, diarrhea, nausea and vomiting.   Genitourinary: Negative for dysuria and urgency.   Musculoskeletal: Positive for falls. Negative for back pain and myalgias.   Skin: Negative for itching and rash.        LUE and L upper flank bruising   Neurological: Negative for dizziness and headaches.   Psychiatric/Behavioral: Positive for memory loss. The patient is not nervous/anxious.      Past Medical History:   Past Medical History:   Diagnosis Date   • Afib (CMS-HCC)    • CATARACT     removed       Past Surgical History:  Past Surgical History:   Procedure Laterality Date   • GYN SURGERY      hysterectomy,   • OTHER      polynidal cyst removed.   • OTHER ABDOMINAL SURGERY      ada       Current Outpatient Medications:  Aspirin 325 mg daily     Medication Allergy/Sensitivities:  No Known Allergies      Family History:  History reviewed. No pertinent family history.    Social History:  Social History     Social History   • Marital status: Single     Spouse name: N/A   • Number of children: N/A   • Years of education: N/A     Occupational History   • Not on file.     Social History  "Main Topics   • Smoking status: Former Smoker     Years: 5.00   • Smokeless tobacco: Former User      Comment: 25 years ago   • Alcohol use No   • Drug use: No   • Sexual activity: Not on file     Other Topics Concern   • Not on file     Social History Narrative    Resides in assisted living facility.      Living situation: Lives in assisted living facility, mobility with cane.   PCP : None.     Physical Exam     Vitals:    01/11/18 0130 01/11/18 0300 01/11/18 0330 01/11/18 0400   BP:       Pulse: 79 69 71 64   Resp: (!) 21 16 20 (!) 23   Temp:       TempSrc:       SpO2: 91% 94% 93% 95%   Weight:       Height:         Body mass index is 26.94 kg/m².  /74   Pulse 64   Temp 36.8 °C (98.2 °F) (Temporal)   Resp (!) 23   Ht 1.676 m (5' 6\")   Wt 75.7 kg (166 lb 14.2 oz)   SpO2 95%   BMI 26.94 kg/m²   O2 therapy: Pulse Oximetry: 95 %, O2 (LPM): 2, O2 Delivery: Nasal Cannula    Physical Exam  General: No acute distress   HEENT: moist mucous membranes, EOMI, PERRL  NECK: no cervical lymphadenopathy, supple  Lungs: apices clear to auscultation bilaterally   Cardiovascular: irregularly irregular, no murmurs or gallops  Abdomen: soft, non tender, non distended, normoactive bowel sounds   Extremities: no swelling, strength to flexion intact in bilateral upper and lower extremities +3/5  Skin:  LUE and L upper flank ecchymosis  Neuro: no gross focal deficits, nystagmus b/l on lateral gaze  Orientation: alert, oriented to place and year     Data Review       Old Records Request:   Completed  Current Records review and summary: Completed    Lab Data Review:  Recent Results (from the past 24 hour(s))   CBC WITH DIFFERENTIAL    Collection Time: 01/10/18  9:48 PM   Result Value Ref Range    WBC 4.1 (L) 4.8 - 10.8 K/uL    RBC 5.02 4.20 - 5.40 M/uL    Hemoglobin 15.4 12.0 - 16.0 g/dL    Hematocrit 45.9 37.0 - 47.0 %    MCV 91.4 81.4 - 97.8 fL    MCH 30.7 27.0 - 33.0 pg    MCHC 33.6 33.6 - 35.0 g/dL    RDW 45.6 35.9 - 50.0 " fL    Platelet Count 172 164 - 446 K/uL    MPV 11.3 9.0 - 12.9 fL    Neutrophils-Polys 63.10 44.00 - 72.00 %    Lymphocytes 17.40 (L) 22.00 - 41.00 %    Monocytes 18.60 (H) 0.00 - 13.40 %    Eosinophils 0.00 0.00 - 6.90 %    Basophils 0.70 0.00 - 1.80 %    Immature Granulocytes 0.20 0.00 - 0.90 %    Nucleated RBC 0.00 /100 WBC    Neutrophils (Absolute) 2.60 2.00 - 7.15 K/uL    Lymphs (Absolute) 0.72 (L) 1.00 - 4.80 K/uL    Monos (Absolute) 0.77 0.00 - 0.85 K/uL    Eos (Absolute) 0.00 0.00 - 0.51 K/uL    Baso (Absolute) 0.03 0.00 - 0.12 K/uL    Immature Granulocytes (abs) 0.01 0.00 - 0.11 K/uL    NRBC (Absolute) 0.00 K/uL   COMP METABOLIC PANEL    Collection Time: 01/10/18  9:48 PM   Result Value Ref Range    Sodium 135 135 - 145 mmol/L    Potassium 4.1 3.6 - 5.5 mmol/L    Chloride 104 96 - 112 mmol/L    Co2 20 20 - 33 mmol/L    Anion Gap 11.0 0.0 - 11.9    Glucose 98 65 - 99 mg/dL    Bun 18 8 - 22 mg/dL    Creatinine 0.72 0.50 - 1.40 mg/dL    Calcium 9.0 8.5 - 10.5 mg/dL    AST(SGOT) 33 12 - 45 U/L    ALT(SGPT) 16 2 - 50 U/L    Alkaline Phosphatase 84 30 - 99 U/L    Total Bilirubin 0.5 0.1 - 1.5 mg/dL    Albumin 3.6 3.2 - 4.9 g/dL    Total Protein 7.2 6.0 - 8.2 g/dL    Globulin 3.6 (H) 1.9 - 3.5 g/dL    A-G Ratio 1.0 g/dL   LIPASE    Collection Time: 01/10/18  9:48 PM   Result Value Ref Range    Lipase 28 11 - 82 U/L   ESTIMATED GFR    Collection Time: 01/10/18  9:48 PM   Result Value Ref Range    GFR If African American >60 >60 mL/min/1.73 m 2    GFR If Non African American >60 >60 mL/min/1.73 m 2   TSH    Collection Time: 01/10/18  9:48 PM   Result Value Ref Range    TSH 1.970 0.380 - 5.330 uIU/mL   BNP    Collection Time: 01/10/18  9:48 PM   Result Value Ref Range    B Natriuretic Peptide 171 (H) 0 - 100 pg/mL   EKG    Collection Time: 01/10/18 11:53 PM   Result Value Ref Range    Report       [facility] Emergency Dept.    Test Date:  2018-01-10  Pt Name:    ORTIZ GARCIA                   Department: ER  MRN:        [id number]                      Room:       [facility]  Gender:     Female                       Technician: [id number]  :        [id number]                   Requested By:PRAKASH MCKOY  Order #:    [id number]                    Reading MD:    Measurements  Intervals                                Axis  Rate:       80                           P:  UT:                                      QRS:        33  QRSD:       76                           T:          8  QT:         392  QTc:        453    Interpretive Statements  ATRIAL FIBRILLATION  VENTRICULAR PREMATURE COMPLEX  Compared to ECG 2010 23:55:26  Ventricular premature complex(es) now present         Imaging/Procedures Review:    Independant Imaging Review: Completed  CT-HEAD W/O   Final Result         1. No acute intracranial findings. No evidence of acute intracranial hemorrhage or mass lesion.      2. White matter lucencies most consistent with chronic small vessel ischemic change.               DX-CHEST-LIMITED (1 VIEW)   Final Result         Mild diffuse interstitial prominence could relate to chronic changes or mild fluid overload.      Stable cardiomegaly.         EKG:   EKG Independant Review: Completed  QTc:453, HR: 80, Atrial Fibrillation w/PVC, no new ST/T changes.     Records reviewed and summarized in current documentation         Assessment/Plan     * Generalized weakness   Assessment & Plan    - likely due to deconditioning   - reports some orthostasis but bps have been wnl   - no change in bps or hr's for lying flat to sitting   - no infxn, tsh wnl   Plan   - consider orthostatics   - IVF @75/hour   - PT/OT to eval and treat             Chronic atrial fibrillation (CMS-HCC)- (present on admission)   Assessment & Plan    - EKG showed atrial fibrillation  - [id number] +3  - current on asa 325 mg daily   Plan   - to discuss risks vs benefits of anticoagulation. Patient currently open to discussion. Will need to further discuss  with her and her son when she is less tired          History of Falls    Assessment & Plan    - history of mechanical falls, no fractures   Plan   - PT/OT to eval and treat   - will also need pcp for outpatient follow up             Anticipated Hospital stay: Observation admit    Quality Measures    Reviewed items::  Labs reviewed, EKG reviewed, Medications reviewed and Radiology images reviewed  Shepherd catheter::  No Shepherd  DVT prophylaxis pharmacological::  Enoxaparin (Lovenox)  DVT prophylaxis - mechanical:  SCDs  Ulcer Prophylaxis::  No

## 2018-01-11 NOTE — PROGRESS NOTES
Nurse performed assessment on patient. Nurse explain to patient the medications this nurse is giving. Patient insisted refused her Lovenox and her laxatives. Patient stated she had her last BM 2 days ago and that she does not want to take those medications despite education.

## 2018-01-11 NOTE — PROGRESS NOTES
Report taken from Raymond Rolon. Patient is resting comfortably. No concerns or issues at this time. All precautions are in place. Cont to monitor.

## 2018-01-11 NOTE — ED NOTES
Pt brought back to rm GR 31 from triage by WC. Pt able to transfer self to bed, pt in gown, on monitor, family at bedside, call light in reach. Chart up for ERP.

## 2018-01-11 NOTE — ED NOTES
Blood drawn and sent to lab.  Apologized for long wait.  Instructed pt to contact ER staff is deciding to leave ER or has any questions or concerns.  Pt back to lobby.

## 2018-01-12 ENCOUNTER — PATIENT OUTREACH (OUTPATIENT)
Dept: HEALTH INFORMATION MANAGEMENT | Facility: OTHER | Age: 83
End: 2018-01-12

## 2018-01-12 VITALS
RESPIRATION RATE: 18 BRPM | BODY MASS INDEX: 26.79 KG/M2 | SYSTOLIC BLOOD PRESSURE: 140 MMHG | OXYGEN SATURATION: 92 % | HEART RATE: 76 BPM | WEIGHT: 166.67 LBS | TEMPERATURE: 97.9 F | DIASTOLIC BLOOD PRESSURE: 66 MMHG | HEIGHT: 66 IN

## 2018-01-12 PROBLEM — F03.90 DEMENTIA (HCC): Status: RESOLVED | Noted: 2018-01-11 | Resolved: 2018-01-12

## 2018-01-12 PROBLEM — D72.819 LEUKOPENIA: Status: ACTIVE | Noted: 2018-01-12

## 2018-01-12 LAB
ANION GAP SERPL CALC-SCNC: 7 MMOL/L (ref 0–11.9)
BASOPHILS # BLD AUTO: 0.7 % (ref 0–1.8)
BASOPHILS # BLD AUTO: 0.9 % (ref 0–1.8)
BASOPHILS # BLD: 0.02 K/UL (ref 0–0.12)
BASOPHILS # BLD: 0.02 K/UL (ref 0–0.12)
BUN SERPL-MCNC: 18 MG/DL (ref 8–22)
CALCIUM SERPL-MCNC: 7.9 MG/DL (ref 8.5–10.5)
CHLORIDE SERPL-SCNC: 107 MMOL/L (ref 96–112)
CO2 SERPL-SCNC: 23 MMOL/L (ref 20–33)
CREAT SERPL-MCNC: 0.75 MG/DL (ref 0.5–1.4)
EOSINOPHIL # BLD AUTO: 0 K/UL (ref 0–0.51)
EOSINOPHIL # BLD AUTO: 0 K/UL (ref 0–0.51)
EOSINOPHIL NFR BLD: 0 % (ref 0–6.9)
EOSINOPHIL NFR BLD: 0 % (ref 0–6.9)
ERYTHROCYTE [DISTWIDTH] IN BLOOD BY AUTOMATED COUNT: 45.3 FL (ref 35.9–50)
ERYTHROCYTE [DISTWIDTH] IN BLOOD BY AUTOMATED COUNT: 45.4 FL (ref 35.9–50)
GLUCOSE SERPL-MCNC: 80 MG/DL (ref 65–99)
HCT VFR BLD AUTO: 41.7 % (ref 37–47)
HCT VFR BLD AUTO: 42.3 % (ref 37–47)
HGB BLD-MCNC: 13.6 G/DL (ref 12–16)
HGB BLD-MCNC: 14 G/DL (ref 12–16)
HGB RETIC QN AUTO: 34.4 PG/CELL (ref 29–35)
IMM GRANULOCYTES # BLD AUTO: 0 K/UL (ref 0–0.11)
IMM GRANULOCYTES NFR BLD AUTO: 0 % (ref 0–0.9)
IMM RETICS NFR: 7 % (ref 9.3–17.4)
LYMPHOCYTES # BLD AUTO: 0.9 K/UL (ref 1–4.8)
LYMPHOCYTES # BLD AUTO: 1.19 K/UL (ref 1–4.8)
LYMPHOCYTES NFR BLD: 37.7 % (ref 22–41)
LYMPHOCYTES NFR BLD: 41.3 % (ref 22–41)
MANUAL DIFF BLD: NORMAL
MCH RBC QN AUTO: 30.6 PG (ref 27–33)
MCH RBC QN AUTO: 30.7 PG (ref 27–33)
MCHC RBC AUTO-ENTMCNC: 32.6 G/DL (ref 33.6–35)
MCHC RBC AUTO-ENTMCNC: 33.1 G/DL (ref 33.6–35)
MCV RBC AUTO: 92.8 FL (ref 81.4–97.8)
MCV RBC AUTO: 93.7 FL (ref 81.4–97.8)
MONOCYTES # BLD AUTO: 0.54 K/UL (ref 0–0.85)
MONOCYTES # BLD AUTO: 0.61 K/UL (ref 0–0.85)
MONOCYTES NFR BLD AUTO: 18.8 % (ref 0–13.4)
MONOCYTES NFR BLD AUTO: 25.4 % (ref 0–13.4)
MORPHOLOGY BLD-IMP: NORMAL
NEUTROPHILS # BLD AUTO: 0.86 K/UL (ref 2–7.15)
NEUTROPHILS # BLD AUTO: 1.13 K/UL (ref 2–7.15)
NEUTROPHILS NFR BLD: 32.5 % (ref 44–72)
NEUTROPHILS NFR BLD: 39.2 % (ref 44–72)
NEUTS BAND NFR BLD MANUAL: 3.5 % (ref 0–10)
NRBC # BLD AUTO: 0 K/UL
NRBC # BLD AUTO: 0 K/UL
NRBC BLD-RTO: 0 /100 WBC
NRBC BLD-RTO: 0 /100 WBC
OVALOCYTES BLD QL SMEAR: NORMAL
PLATELET # BLD AUTO: 143 K/UL (ref 164–446)
PLATELET # BLD AUTO: 143 K/UL (ref 164–446)
PLATELET BLD QL SMEAR: NORMAL
PMV BLD AUTO: 11.6 FL (ref 9–12.9)
PMV BLD AUTO: 11.9 FL (ref 9–12.9)
POIKILOCYTOSIS BLD QL SMEAR: NORMAL
POTASSIUM SERPL-SCNC: 4.3 MMOL/L (ref 3.6–5.5)
RBC # BLD AUTO: 4.45 M/UL (ref 4.2–5.4)
RBC # BLD AUTO: 4.56 M/UL (ref 4.2–5.4)
RBC BLD AUTO: PRESENT
RETICS # AUTO: 0.05 M/UL (ref 0.04–0.06)
RETICS/RBC NFR: 1.1 % (ref 0.8–2.1)
SMUDGE CELLS BLD QL SMEAR: NORMAL
SODIUM SERPL-SCNC: 137 MMOL/L (ref 135–145)
WBC # BLD AUTO: 2.4 K/UL (ref 4.8–10.8)
WBC # BLD AUTO: 2.9 K/UL (ref 4.8–10.8)

## 2018-01-12 PROCEDURE — G0378 HOSPITAL OBSERVATION PER HR: HCPCS

## 2018-01-12 PROCEDURE — 99217 PR OBSERVATION CARE DISCHARGE: CPT | Mod: GC | Performed by: INTERNAL MEDICINE

## 2018-01-12 PROCEDURE — 700111 HCHG RX REV CODE 636 W/ 250 OVERRIDE (IP): Performed by: STUDENT IN AN ORGANIZED HEALTH CARE EDUCATION/TRAINING PROGRAM

## 2018-01-12 PROCEDURE — 94760 N-INVAS EAR/PLS OXIMETRY 1: CPT

## 2018-01-12 PROCEDURE — 85007 BL SMEAR W/DIFF WBC COUNT: CPT

## 2018-01-12 PROCEDURE — 80048 BASIC METABOLIC PNL TOTAL CA: CPT

## 2018-01-12 PROCEDURE — 85046 RETICYTE/HGB CONCENTRATE: CPT

## 2018-01-12 PROCEDURE — G8979 MOBILITY GOAL STATUS: HCPCS | Mod: CI

## 2018-01-12 PROCEDURE — 97116 GAIT TRAINING THERAPY: CPT

## 2018-01-12 PROCEDURE — 36415 COLL VENOUS BLD VENIPUNCTURE: CPT

## 2018-01-12 PROCEDURE — G8980 MOBILITY D/C STATUS: HCPCS | Mod: CI

## 2018-01-12 PROCEDURE — 99213 OFFICE O/P EST LOW 20 MIN: CPT | Performed by: INTERNAL MEDICINE

## 2018-01-12 PROCEDURE — 85027 COMPLETE CBC AUTOMATED: CPT

## 2018-01-12 PROCEDURE — A9270 NON-COVERED ITEM OR SERVICE: HCPCS | Performed by: STUDENT IN AN ORGANIZED HEALTH CARE EDUCATION/TRAINING PROGRAM

## 2018-01-12 PROCEDURE — 85025 COMPLETE CBC W/AUTO DIFF WBC: CPT

## 2018-01-12 PROCEDURE — 700102 HCHG RX REV CODE 250 W/ 637 OVERRIDE(OP): Performed by: STUDENT IN AN ORGANIZED HEALTH CARE EDUCATION/TRAINING PROGRAM

## 2018-01-12 RX ORDER — IPRATROPIUM BROMIDE AND ALBUTEROL SULFATE 2.5; .5 MG/3ML; MG/3ML
3 SOLUTION RESPIRATORY (INHALATION)
Status: DISCONTINUED | OUTPATIENT
Start: 2018-01-12 | End: 2018-01-12 | Stop reason: HOSPADM

## 2018-01-12 RX ADMIN — ENOXAPARIN SODIUM 40 MG: 100 INJECTION SUBCUTANEOUS at 08:44

## 2018-01-12 RX ADMIN — ASPIRIN 324 MG: 81 TABLET, CHEWABLE ORAL at 08:43

## 2018-01-12 ASSESSMENT — ENCOUNTER SYMPTOMS
HEMOPTYSIS: 0
FEVER: 0
PALPITATIONS: 0
COUGH: 1
DIARRHEA: 0
ABDOMINAL PAIN: 0
CONSTIPATION: 1
SPUTUM PRODUCTION: 1
VOMITING: 0
DIZZINESS: 1
WEAKNESS: 1
BLURRED VISION: 0
NAUSEA: 0
CHILLS: 0
SORE THROAT: 0

## 2018-01-12 ASSESSMENT — COGNITIVE AND FUNCTIONAL STATUS - GENERAL
SUGGESTED CMS G CODE MODIFIER MOBILITY: CJ
CLIMB 3 TO 5 STEPS WITH RAILING: A LOT
WALKING IN HOSPITAL ROOM: A LITTLE
MOBILITY SCORE: 20
STANDING UP FROM CHAIR USING ARMS: A LITTLE

## 2018-01-12 ASSESSMENT — GAIT ASSESSMENTS
DISTANCE (FEET): 150
DEVIATION: DECREASED BASE OF SUPPORT
GAIT LEVEL OF ASSIST: SUPERVISED
ASSISTIVE DEVICE: FRONT WHEEL WALKER

## 2018-01-12 ASSESSMENT — PAIN SCALES - GENERAL: PAINLEVEL_OUTOF10: 0

## 2018-01-12 NOTE — DISCHARGE SUMMARY
Internal Medicine Discharge Summary  Note Author: Kathi Lindo M.D.       Admit Date:  1/10/2018       Discharge Date:   1/12/2018     Service:   R Internal Medicine Red Team  Attending Physician(s):   Dr. Kirk       Senior Resident(s):   Dr. Flaherty  Héctor Resident(s):   Dr. Lindo      Primary Diagnosis:   Generalized weakness likely secondary to dehydration    Secondary Diagnoses:                Principal Problem:    Generalized weakness POA: Yes  Active Problems:    Chronic atrial fibrillation (CMS-HCC) (Chronic) POA: Yes    Nystagmus POA: Yes    Leukopenia POA: Yes    History of Falls  (Chronic) POA: No    Asymptomatic bacteriuria POA: Yes    Dizziness POA: Yes  Resolved Problems:    Dementia POA: Yes      Hospital Summary (Brief Narrative):       Ms. Lyons is a 87 yo female with a PMH of chronic A-fib not on OAC who presents for generalized weakness and falls. She was noted to have some vomiting and diarrhea and poor PO intake preceding the admission. She also noted some orthostatic symptoms during this time. Patient also noted 2 recent episodes of ground level falls, likely mechanical.     At [facility], patient was noted to be afebrile and hemodynamically stable. She was noted to have vertical and horizontal nystagmus on exam, with acute onset and fatigability. CT head was negative. Patient was given IVF and PT/OT were consulted. PT initially recommended placement at a SNF however after re-evaluating the patient stated she would be able to be discharged home with a front-wheeled walker.     As per recommendations for evaluation of central nystagmus, it is recommended that the patient undergo and MRI/MRA of her brain. She was also noted to have leukopenia to 2.4, with an ANC of 0.86. The patient refused to have the MRI or a workup done for her leukopenia during this hospitalization as her and her son wanted to be discharged. Patient was discharged in stable condition. She was educated  about the importance of having a close follow up with her PCP at the assisted living facility, obtaining an outpatient MRI/MRA to evaluate for central nystagmus, a lab slip for a repeat CBC to be completed in a week and a home health referral. Patient was informed of the risks of leaving the hospital before a workup can be completed and stated she understood the potential consequences.       Patient /Hospital Summary (Details -- Problem Oriented) :          * Generalized weakness   Assessment & Plan    - likely due to deconditioning, poor PO intake   - UA suggestive of infection, however denies urinary symptoms    - orthostatics negative  - PT/OT recommend patient able to be discharged home        Leukopenia   Assessment & Plan    - Patient noted to have a leukopenia to 2.4 with ANC of 0.86, currently trending up  - Slight thrombocytopenia noted to 140, no signs of anemia  - Reticulocyte count appropriate  - Possibly due to viral infection causing bone marrow suppression, further workup/monitoring likely required  - Patient refuses to stay for further workup, will provide script for repeat CBC in 1 week        Nystagmus   Assessment & Plan    - Patient noted to have nystagmus on PE, fatigability noted  - No other focal neurological deficits noted, cranial nerve exam grossly normal  - Likely peripheral nystagmus, however recent history of falls is concerning for central nystagmus  - MRI/MRA ordered, patient refused as inpatient, strongly recommend as outpatient        Chronic atrial fibrillation (CMS-HCC)   Assessment & Plan    - EKG showed atrial fibrillation  - [id number] +3  - Continue asa 325 mg daily   - Patient is a poor candidate for OAC due to history of frequent falls        History of Falls    Assessment & Plan    - history of mechanical falls, no fractures hx  - CT head negative  - PT/OT          Consultants:     None    Procedures:        None    Imaging/ Testing:      CT-HEAD W/O   Final Result          1. No acute intracranial findings. No evidence of acute intracranial hemorrhage or mass lesion.      2. White matter lucencies most consistent with chronic small vessel ischemic change.               DX-CHEST-LIMITED (1 VIEW)   Final Result         Mild diffuse interstitial prominence could relate to chronic changes or mild fluid overload.      Stable cardiomegaly.          Discharge Medications:         Medication Reconciliation: Completed     Medication List      CONTINUE taking these medications      Instructions   aspirin 81 MG tablet   Take 81 mg by mouth every day.  Dose:  81 mg     hydrocodone-acetaminophen 5-325 MG Tabs per tablet  Commonly known as:  NORCO   Take 1-2 Tabs by mouth every four hours as needed.  Dose:  1-2 Tab        STOP taking these medications    OXYCODONE HCL PO            Disposition:   Stable    Diet:   Regular    Activity:   As tolerated    Instructions:         The patient was instructed to return to the ER in the event of worsening symptoms. I have counseled the patient on the importance of compliance and the patient has agreed to proceed with all medical recommendations and follow up plan indicated above.   The patient understands that all medications come with benefits and risks. Risks may include permanent injury or death and these risks can be minimized with close reassessment and monitoring.        Primary Care Provider:    Patient states she has a PCP at her assisted living facility      Follow up appointment details :    Please follow up with PCP as soon as possible    Pending Studies:      None      Discharge Time (Minutes) :    Greater than 45 minutes spent on discharge day patient visit, preparing discharge paperwork and arranging for patient follow up.    Hospital Course Type: Observation Stay      Condition on Discharge    ______________________________________________________________________    Interval history/exam for day of discharge:    Patient stable, no acute  distress, patient and son stated she wants to leave today and will leave AMA if needed, educated about need for close outpatient follow up    Vitals:    01/11/18 2234 01/12/18 0315 01/12/18 0735 01/12/18 1105   BP:  132/67 127/78    Pulse: 84 66 100 98   Resp: 16 16 16 16   Temp:  36.6 °C (97.8 °F) 37.1 °C (98.7 °F)    TempSrc:       SpO2: 96% 93% 94% 94%   Weight:       Height:         Weight/BMI: Body mass index is 26.9 kg/m².  Pulse Oximetry: 94 %, O2 (LPM): 0, O2 Delivery: None (Room Air)    General: No acute distress  CVS: Regular rate and rhythm, no murmur appreciated  PULM: Clear to auscultation bilaterally  Abd: Soft, non-tender, no hepatosplenomegaly    Most Recent Labs:    Lab Results   Component Value Date/Time    WBC 2.9 (L) 01/12/2018 07:55 AM    RBC 4.56 01/12/2018 07:55 AM    HEMOGLOBIN 14.0 01/12/2018 07:55 AM    HEMATOCRIT 42.3 01/12/2018 07:55 AM    MCV 92.8 01/12/2018 07:55 AM    MCH 30.7 01/12/2018 07:55 AM    MCHC 33.1 (L) 01/12/2018 07:55 AM    MPV 11.9 01/12/2018 07:55 AM    NEUTSPOLYS 39.20 (L) 01/12/2018 07:55 AM    LYMPHOCYTES 41.30 (H) 01/12/2018 07:55 AM    MONOCYTES 18.80 (H) 01/12/2018 07:55 AM    EOSINOPHILS 0.00 01/12/2018 07:55 AM    BASOPHILS 0.70 01/12/2018 07:55 AM      Lab Results   Component Value Date/Time    SODIUM 137 01/12/2018 02:18 AM    POTASSIUM 4.3 01/12/2018 02:18 AM    CHLORIDE 107 01/12/2018 02:18 AM    CO2 23 01/12/2018 02:18 AM    GLUCOSE 80 01/12/2018 02:18 AM    BUN 18 01/12/2018 02:18 AM    CREATININE 0.75 01/12/2018 02:18 AM      Lab Results   Component Value Date/Time    ALTSGPT 16 01/10/2018 09:48 PM    ASTSGOT 33 01/10/2018 09:48 PM    ALKPHOSPHAT 84 01/10/2018 09:48 PM    TBILIRUBIN 0.5 01/10/2018 09:48 PM    LIPASE 28 01/10/2018 09:48 PM    ALBUMIN 3.6 01/10/2018 09:48 PM    GLOBULIN 3.6 (H) 01/10/2018 09:48 PM    INR 1.11 07/27/2010 10:35 AM     Lab Results   Component Value Date/Time    PROTHROMBTM 12.8 07/27/2010 10:35 AM    INR 1.11 07/27/2010 10:35 AM

## 2018-01-12 NOTE — FACE TO FACE
Face to Face Note  -  Durable Medical Equipment    Kathi Lindo M.D. - NPI: [id number]  I certify that this patient is under my care and that they had a durable medical equipment(DME)face to face encounter by myself that meets the physician DME face-to-face encounter requirements with this patient on:    Date of encounter:   Patient:                    MRN:                       YOB: 2018  Sindy Lyons  [id number]  6/26/1931     The encounter with the patient was in whole, or in part, for the following medical condition, which is the primary reason for durable medical equipment:  Other - Abnormal gait    I certify that, based on my findings, the following durable medical equipment is medically necessary:  Walkers.      My Clinical findings support the need for the above equipment due to:  Frequent Falls    Supporting Symptoms: PT evaluation indicating need for front wheeled walker

## 2018-01-12 NOTE — PROGRESS NOTES
Assumed care of pt, discussed plan of care. A&Ox4. Anxious, reassurance given. RA, tolerates well. RML, RLL, LLL diminished.Complains of 3/10 pain, head. Medicated per MAR. Last BM, 01/09. Encouraged stool softeners, pt continued to refuse despite education. Nauseousgeorgette MD, ordered given for zofran. Medicated per MAR. Skin intact; bruising, fragile. SBA with FWW, steady; has generalized weakness. On regular diet, takes pills whole without difficulty. IV, CDI, running NS at 75 mL/hr. Fall precautions in place; bed lowest position, bed alarm on, treaded socks on, call light within reach. All needs met at this time.

## 2018-01-12 NOTE — PROGRESS NOTES
[facility] ATTENDING NOTE:      Date & Time note created:   1/12/2018   2:12 PM     Visit Time:   Attending/resident bedside rounds 9-11:30 AM    The patient was evaluated with the resident staff. I reviewed the resident's note and agree with the resident's findings and plan as documented in the resident's note except as documented in the attending note. Please reference resident daily note for complete information.The chart was reviewed and summarized. Available labs, imaging, O2 sats, EKGs were reviewed. Available nursing, consultant and resident notes were reviewed. I am actively involved in the patient's care.                                                                BRIEF DISCUSSION:                                                           Seen patient during AM rounds. She was sitting on the side of the bed. She is able to reason through her conversation and told us that she wants to leave. She continues to be dizzy but tells us that she will not fall because she knows how to avoid that.     We told her that we will talk to her again along with her family to come up with a safe disposition plan. Request PT to reassess her safety.     D/W with geriatric service. Dr. Hines feels she will be able to care for herself at home. We will arrange Home health for Home Safety assessment and PT.     CBC this am looks abnormal. Leucopenia present but other cell lines ok. Repeat CBC in one week.     Nystagmus, Dizziness and falls concerning for Central causes. Strongly recommend MRI/MRA. Patient refuses to get  it in the hospital.  Plan outpatient investigation.   Arrange PCP f/u before DC.    Active Hospital Problems    Diagnosis   • Generalized weakness [R53.1]     Priority: High   • Chronic atrial fibrillation (CMS-HCC) [I48.2]     Priority: Medium   • Nystagmus [H55.00]     Priority: Medium   • History of Falls  [W19.XXXA]     Priority: Low   • Asymptomatic bacteriuria [R82.71]   • Dizziness [R42]        Vitals:    01/11/18 2234 01/12/18 0315 01/12/18 0735 01/12/18 1105   BP:  132/67 127/78    Pulse: 84 66 100 98   Resp: 16 16 16 16   Temp:  36.6 °C (97.8 °F) 37.1 °C (98.7 °F)    TempSrc:       SpO2: 96% 93% 94% 94%   Weight:       Height:         Weight/BMI: Body mass index is 26.9 kg/m².  Pulse Oximetry: 94 %, O2 (LPM): 0, O2 Delivery: None (Room Air)    Intake/Output Summary (Last 24 hours) at 01/12/18 1412  Last data filed at 01/12/18 1105   Gross per 24 hour   Intake             1855 ml   Output                0 ml   Net             1855 ml       Damaris Kirk MD   Academic Hospitalist

## 2018-01-12 NOTE — NON-PROVIDER
Internal Medicine Medical Student Note  Note Author: Luda Teran, Student    Name Sindy Lyons       1931   Age/Sex 86 y.o. female   MRN [id number]   Code Status Full     After 5PM or if no immediate response to page, please call for cross-coverage  Attending/Team: Dr. Kirk See Patient List for primary contact information  Call [phone] to page after hours   1st Call - Day Intern (R1):   Dr. De Leon 2nd Call - Day Sr. Resident (R2/R3):   Dr. Frost     Reason for interval visit  (Principal Problem)   Generalized weakness    Interval Problem Daily Status Update  (24 hours)   Sindy Lyons is a 86 y.o. female w/ hx of a-fib and dementia who presented w/ 1 episode of n/v and diarrhea with dizziness/weakness for one month. She also experienced 2 falls that seemed to be mechanical in the past wk and hurt her shoulder during these.    PT came and noted that pt may need acute rehabilitation or intensive inpt PT/OT before being discharged home. Pt's WBC= 2.4 yesterday and periph smear was ordered. Today pt is frustrated that she is still in the hospital and is eager to leave. Her sons are coming today from out of town. She states that PT did not come see her. She had one episode of nausea and non-bloody emesis that resolved w/ Zofran. Her dizziness is improved, but not resolved. Pt is threatening to leave AMA if she is not discharged soon.    Review of Systems   Constitutional: Negative for chills and fever.   HENT: Negative for sore throat and tinnitus.    Eyes: Negative for blurred vision.   Respiratory: Positive for cough and sputum production. Negative for hemoptysis.    Cardiovascular: Negative for chest pain and palpitations.   Gastrointestinal: Positive for constipation. Negative for abdominal pain, diarrhea, nausea and vomiting.   Genitourinary: Negative for dysuria, frequency and urgency.   Neurological: Positive for dizziness and weakness.     Physical Exam     Vitals:    18 2234  01/12/18 0315 01/12/18 0735 01/12/18 1105   BP:  132/67 127/78    Pulse: 84 66 100 98   Resp: 16 16 16 16   Temp:  36.6 °C (97.8 °F) 37.1 °C (98.7 °F)    TempSrc:       SpO2: 96% 93% 94% 94%   Weight:       Height:         Body mass index is 26.9 kg/m².    Oxygen Therapy:  Pulse Oximetry: 94 %, O2 (LPM): 0, O2 Delivery: None (Room Air)    Physical Exam   Constitutional: She is oriented to person, place, and time and well-developed, well-nourished, and in no distress. No distress.   HENT:   Head: Normocephalic and atraumatic.   Right Ear: External ear normal.   Left Ear: External ear normal.   Nose: Nose normal.   Mouth/Throat: Oropharynx is clear and moist. Mucous membranes are dry. No oropharyngeal exudate.   Eyes: Conjunctivae are normal. Pupils are equal, round, and reactive to light. Right eye exhibits no discharge. Left eye exhibits no discharge. Right conjunctiva is not injected. Right conjunctiva has no hemorrhage. Left conjunctiva is not injected. Left conjunctiva has no hemorrhage. No scleral icterus. Right eye exhibits nystagmus. Right eye exhibits normal extraocular motion. Left eye exhibits nystagmus. Left eye exhibits normal extraocular motion.   Horizontal nystagmus on lateral gaze b/l that is fatigable w/ rapid onset.  Torsional nystagmus on superior gaze b/l that is fatigable w/ rapid onset.   Neck: Normal range of motion. Neck supple. No JVD present. No tracheal deviation present.   Cardiovascular: Normal rate and intact distal pulses.  An irregular rhythm present. Exam reveals no gallop.    No murmur heard.  Pulmonary/Chest: Effort normal and breath sounds normal. No respiratory distress. She has no wheezes. She has no rales.   Abdominal: Soft. Bowel sounds are normal. She exhibits no distension. There is no tenderness. There is no rebound and no guarding.   Musculoskeletal: Normal range of motion. She exhibits no edema, tenderness or deformity.   Neurological: She is alert and oriented to person,  place, and time. She has normal sensation, normal strength and intact cranial nerves. She is agitated. She displays no weakness, no atrophy, facial symmetry and normal speech. No cranial nerve deficit or sensory deficit. She exhibits normal muscle tone. She has a normal Cerebellar Exam and a normal Finger-Nose-Finger Test. She shows no pronator drift. Coordination normal. GCS score is 15.   Skin: Skin is warm and dry. She is not diaphoretic.     Lab Results   Component Value Date/Time    WBC 2.9 (L) 01/12/2018 07:55 AM    RBC 4.56 01/12/2018 07:55 AM    HEMOGLOBIN 14.0 01/12/2018 07:55 AM    HEMATOCRIT 42.3 01/12/2018 07:55 AM    MCV 92.8 01/12/2018 07:55 AM    MCH 30.7 01/12/2018 07:55 AM    MCHC 33.1 (L) 01/12/2018 07:55 AM    MPV 11.9 01/12/2018 07:55 AM    NEUTSPOLYS 39.20 (L) 01/12/2018 07:55 AM    LYMPHOCYTES 41.30 (H) 01/12/2018 07:55 AM    MONOCYTES 18.80 (H) 01/12/2018 07:55 AM    EOSINOPHILS 0.00 01/12/2018 07:55 AM    BASOPHILS 0.70 01/12/2018 07:55 AM        Assessment/Plan   Sindy Lyons is a 86 y.o. female w/ hx of a-fib & dementia who presented w/ 1 episode of n/v and diarrhea w/ one month of dizziness. She is now found to be neutropenic.    # Dizziness/weakness  - Likely 2/2 dehydration (presyncopal) vs. Urosepsis vs. BPPV vs. CVA vs. Viral cochleitis or labarynthitis vs. Vestibular neuritis  - Pt had vomiting & diarrhea, which may have led to diarrhea and pt only drinks one bottle of H2O per day  - Worrisome causes of dizziness in elderly pt are urosepsis (since pt also has bacteruria) or CVA (since pt has a-fib and signs of ischemic disease on CT head)  - CT head did not show signs of acute ischemic or hemorrhagic event and pt does not show any focal neuro deficits, making CVA or other central causes of dizziness less likely  - Pt's severe symptoms, such as not being able to sit up w/o becoming dizzy, suggest more of a peripheral cause rather than central cause of her dizziness  - Vertical nystagmus  on horizontal gaze can be a normal finding, but may also indicate vestibular pathology   Plan:   - Order brain MRI to evaluate for central causes of nystagmus/dizziness   - F/u as outpt w/ geriatrician    # Neutropenia  - ANC=0.86, repeat ANC=1.13, indicating moderate neutropenia  - WBC=2.4, repeat WBC=2.9  - Immature reticulocytes low  - Smudge cells, poikilocytosis, and ovalocytes also present  - Mild thrombocytopenia, but no anemia noted  - Likely 2/2 ASA side effect vs. CLL vs. MDS   Plan:   - Continue to follow   - Bone marrow bx not indicated at this time, but can be considered if pt f/u w/ geriatrician and neutropenia/leukeopenia persist    # UTI vs. Asymptomatic bacteruria   - UA was positive, but pt continues to deny dysuria, urinary frequency, etc.  - Dizziness could be a symptom of a UTI in pt, but must also consider that the abn UA may be due to dehydration/ asymptomatic bacteruria and that the dizziness is unrelated   Plan:    - No antibiotics indicated at this time    # Dehydration  - Dry mucous membranes on exam  - Likely 2/2 decreased H2O intake, possibly 2/2 dementia. Recent n/v & diarrhea may have contributed as well.   Plan:   - Cont IVF's   - Encourage increased PO intake of H2O at home    # Ground level falls  - Pt had 2 falls that seemed to be mechanical in nature  - Likely 2/2 dizziness/weakness that pt is complaining of  - Falls in the elderly is a major cause of morbidity and mortality. If pt returns home AMA w/o the help of PT/OT she is at a major risk of falls.   Plan:   - PT/OT continue to work w/ pt and assess. Consider a walker, rather than a cane, for added stability.   - Ensure that the pt's living facility has adequate tools to help pt stay free from falls   - Talk w/ family today when they come to assess goals for pt    # Atrial fibrillation  - Chronic, not on anticoagulation at home, but does take ASA  - CHADSVAC=3, indicating need for long-term anticoagulation   Plan:   - Start  NOAC to prevent stroke 2/2 embolus from a-fib    # Dementia  - Unsure of what pt's baseline cognitive functioning is   Plan:   - Talk w/ family today   - F/u as outpt w/ geriatrician and continue at assisted living facility

## 2018-01-12 NOTE — FACE TO FACE
Face to Face Supporting Documentation - Home Health    The encounter with this patient was in whole or in part the primary reason for home health admission.    Date of encounter:   Patient:                    MRN:                       YOB: 2018  Sindy Lyons  [id number]  6/26/1931     Home health to see patient for:  Physical Therapy evaluation and treatment    Skilled need for:  Abnormal Gait    Homebound status evidenced by:  Need the aid of supportive devices such as crutches, canes, wheelchairs or walkers. Leaving home requires a considerable and taxing effort. There is a normal inability to leave the home.    Community Physician to provide follow up care: PCP at assisted living facility    Optional Interventions? No      I certify the face to face encounter for this home health care referral meets the CMS requirements and the encounter/clinical assessment with the patient was, in whole, or in part, for the medical condition(s) listed above, which is the primary reason for home health care. Based on my clinical findings: the service(s) are medically necessary, support the need for home health care, and the homebound criteria are met.  I certify that this patient has had a face to face encounter by myself.  Kathi Lindo M.D. - NPI: [id number]

## 2018-01-12 NOTE — CARE PLAN
Problem: Bronchoconstriction:  Goal: Improve in air movement and diminished wheezing  Outcome: PROGRESSING AS EXPECTED    Intervention: Implement inhaled treatments  DUO Q4

## 2018-01-12 NOTE — CARE PLAN
Problem: Safety  Goal: Will remain free from falls    Intervention: Implement fall precautions  Pt is SBA with FWW, has generalized weakness. Pt does not call for assistance at times. Fall precautions implemented; bed lowest position, bed alarm on, treaded socks on, call light within reach.       Problem: Knowledge Deficit  Goal: Knowledge of disease process/condition, treatment plan, diagnostic tests, and medications will improve    Intervention: Explain information regarding disease process/condition, treatment plan, diagnostic tests, and medications and document in education  Educated pt on plan of care. Medications scheduled, refused stool softeners despite education. Educated about anti-nausea med that can be ordered for nausea but pt refused. After extensive education, pt was willing to take, called MD and received orders for zofran. Pt expressed feeling better with medication.

## 2018-01-12 NOTE — PROGRESS NOTES
Patient lying in bed, denies any needs at present. Lung clear bilateral, bowel sound present. Denies any nausea or vomiting at present.

## 2018-01-12 NOTE — DISCHARGE INSTRUCTIONS
Discharge Instructions    Discharged to home by car with relative. Discharged via wheelchair, hospital escort: Yes.  Special equipment needed: Not Applicable    Be sure to schedule a follow-up appointment with your primary care doctor or any specialists as instructed.     Discharge Plan:   Influenza Vaccine Indication:  (pt immune compromise held per md)    I understand that a diet low in cholesterol, fat, and sodium is recommended for good health. Unless I have been given specific instructions below for another diet, I accept this instruction as my diet prescription.   Other diet: regular    Special Instructions: None    · Is patient discharged on Warfarin / Coumadin?   No     · Is patient Post Blood Transfusion?  No    Depression / Suicide Risk    As you are discharged from this [facility] facility, it is important to learn how to keep safe from harming yourself.    Recognize the warning signs:  · Abrupt changes in personality, positive or negative- including increase in energy   · Giving away possessions  · Change in eating patterns- significant weight changes-  positive or negative  · Change in sleeping patterns- unable to sleep or sleeping all the time   · Unwillingness or inability to communicate  · Depression  · Unusual sadness, discouragement and loneliness  · Talk of wanting to die  · Neglect of personal appearance   · Rebelliousness- reckless behavior  · Withdrawal from people/activities they love  · Confusion- inability to concentrate     If you or a loved one observes any of these behaviors or has concerns about self-harm, here's what you can do:  · Talk about it- your feelings and reasons for harming yourself  · Remove any means that you might use to hurt yourself (examples: pills, rope, extension cords, firearm)  · Get professional help from the community (Mental Health, Substance Abuse, psychological counseling)  · Do not be alone:Call your Safe Contact- someone whom you trust who will be there for  you.  · Call your local CRISIS HOTLINE 617-8221 or 257-544-9522  · Call your local Children's Mobile Crisis Response Team [place] (248) 754-5839 or www.Lockitron  · Call the toll free National Suicide Prevention Hotlines   · National Suicide Prevention Lifeline 196-704-QJTG (2410)  · National Hope Line Network 800-SUICIDE (052-1031)    Dizziness  Dizziness is a common problem. It is a feeling of unsteadiness or light-headedness. You may feel like you are about to faint. Dizziness can lead to injury if you stumble or fall. Anyone can become dizzy, but dizziness is more common in older adults. This condition can be caused by a number of things, including medicines, dehydration, or illness.  HOME CARE INSTRUCTIONS  Taking these steps may help with your condition:  Eating and Drinking  Drink enough fluid to keep your urine clear or pale yellow. This helps to keep you from becoming dehydrated. Try to drink more clear fluids, such as water.  Do not drink alcohol.  Limit your caffeine intake if directed by your health care provider.  Limit your salt intake if directed by your health care provider.  Activity  Avoid making quick movements.  Rise slowly from chairs and steady yourself until you feel okay.  In the morning, first sit up on the side of the bed. When you feel okay, stand slowly while you hold onto something until you know that your balance is fine.  Move your legs often if you need to  one place for a long time. Tighten and relax your muscles in your legs while you are standing.  Do not drive or operate heavy machinery if you feel dizzy.  Avoid bending down if you feel dizzy. Place items in your home so that they are easy for you to reach without leaning over.  Lifestyle  Do not use any tobacco products, including cigarettes, chewing tobacco, or electronic cigarettes. If you need help quitting, ask your health care provider.  Try to reduce your stress level, such as with yoga or meditation.  Talk with your health care provider if you need help.  General Instructions  Watch your dizziness for any changes.  Take medicines only as directed by your health care provider. Talk with your health care provider if you think that your dizziness is caused by a medicine that you are taking.  Tell a friend or a family member that you are feeling dizzy. If he or she notices any changes in your behavior, have this person call your health care provider.  Keep all follow-up visits as directed by your health care provider. This is important.  SEEK MEDICAL CARE IF:  Your dizziness does not go away.  Your dizziness or light-headedness gets worse.  You feel nauseous.  You have reduced hearing.  You have new symptoms.  You are unsteady on your feet or you feel like the room is spinning.  SEEK IMMEDIATE MEDICAL CARE IF:  You vomit or have diarrhea and are unable to eat or drink anything.  You have problems talking, walking, swallowing, or using your arms, hands, or legs.  You feel generally weak.  You are not thinking clearly or you have trouble forming sentences. It may take a friend or family member to notice this.  You have chest pain, abdominal pain, shortness of breath, or sweating.  Your vision changes.  You notice any bleeding.  You have a headache.  You have neck pain or a stiff neck.  You have a fever.     This information is not intended to replace advice given to you by your health care provider. Make sure you discuss any questions you have with your health care provider.     Document Released: 06/13/2002 Document Revised: 05/03/2016 Document Reviewed: 12/14/2015  [employer] Interactive Patient Education ©2016 [employer]  ·     Discharging Patient home per physician order.  Discharged with home.  Demonstrated understanding of discharge instructions, follow up appointments, home medications, prescriptions, home care for surgical wound, and nursing care instructions for discharge  Ambulating with assistance, voiding  without difficulty, pain well controlled, tolerating oral medications, oxygen saturation greater than 90% , tolerating diet.   Educational handouts dizziness given and discussed.  Verbalized understanding of discharge instructions and educational handouts.  All questions answered.  Belongings with patient at time of discharge.

## 2018-01-12 NOTE — THERAPY
"Physical Therapy Treatment completed.   Bed Mobility:  Supine to Sit: Modified Independent  Transfers: Sit to Stand: Supervised  Gait: Level Of Assist: Supervised with Front-Wheel Walker       Plan of Care: Patient with no further skilled PT needs in the acute care setting at this time  Discharge Recommendations: Equipment: Front-Wheel Walker. Post-acute therapy Currently anticipate no further skilled therapy needs once patient is discharged from the inpatient setting.     See \"Rehab Therapy-Acute\" Patient Summary Report for complete documentation.     Pt. is progressing as expected demonstrating improved balance, gait, activity tolerance, and saftey awareness. Pt. was able to demonstrate SPV to SBA for all functional mobility with a FWW. Pt. demonstrated good handling of FWW and needed minimal VC's during 360 turns, pt. was able to demonstrate appropriate turning with VC's and self corrected. Pt. was able to ambulate w/FWW with no gamaliel LOB and reported of no concerns of reuturning back to long-term at this time. Pt. reports of baseline gait mechanics at this time. Pt. agreeable to using FWW at all times for ambulation. Pt. is in no need of acute skilled PT at this time. Anticpate pt. to d/c back to [facility] soon once medically clear.     "

## 2018-01-12 NOTE — CONSULTS
"[facility] Consult.   Patient Name: Sindy Lyons  Patient Age, Gender: 86 y.o., female  Date of Consult:1/12/2018      Name of Requesting Consultant(s): Damaris Kirk M.D.  Consultant: Charlie Hines M.D.   Reason for Consult: Frequent Falls, imbalance and placement    Chief Complaint:   Chief Complaint   Patient presents with   • Nausea/Vomiting/Diarrhea     \"I've been vomitting the last 2 days, my bowels keep moving. I hurt all over. I'm very dizzy and it's hard to walk. I'm so afraid of falling\". Denies fevers. Denies abd pain.    • Dizziness       History of Present Illness:  History obtained from chart review, patient, medical staff and son.   Sindy is a 86 y.o. female with a PMH of atrial fibrillation only on ASA for anticoagulation who has nto seen a physician in years. She is a very good historian and is able relate many of her values and current concerns.     She tells me that her N/V/D has resolved, the main reason for her admission. She is surprised that she is still admitted, but remarks that she understands the concerns of the physicians on why they want her to stay. She tells me that she is not dizzy and that she mostly has an issue with \"imbalance\" upon changes in position (laying to sitting or sitting to standing). This primarily occurs when she is getting out of bed int the morning. She tells me that she has fallen a few times, but they were all something that she did not wrong. She denies any other prodromal symptosm prior to the fall and insisted they were mechanical. She tells me she graps onto her wall when getting out of bed and towel bar when getting up from the commode. She tells me that she would like to leave and plans to follow up with a primary care doctor. She endores independence in her ADL and uses a walking stick for ambulation. She lives in a very nice independent living facility that takes care of most her of her IADLs. She is feels she is more unsteady than usual because " she has not been walking as much as she does at home. She is able to understand the consequences of her answers and had predetermined beliefs that older people should nto be as quick on their feet as when they were younger and expect to fall and have be slower.     Her imbalance has been progressive over years and the patient remarked, multiple times that the imbalance does not really bother her and that is learning to adapt her life to her new limitations.     Her son corraborates the above history and remarks he can take her home today.      ROS: +dry cough she developed while admitted, denied any ringing in ears or hearing issues.   A 14 sytem ROS is negative other than as stated above in HPI.     Past Medical History:   Diagnosis Date   • Afib (CMS-HCC)    • CATARACT     removed   • Dementia 1/11/2018       Current Facility-Administered Medications:   •  Influenza Vaccine High-Dose pf injection 0.5 mL, 0.5 mL, Intramuscular, Once, Damaris Kirk M.D.  •  senna-docusate (PERICOLACE or SENOKOT S) 8.6-50 MG per tablet 2 Tab, 2 Tab, Oral, BID **AND** polyethylene glycol/lytes (MIRALAX) PACKET 1 Packet, 1 Packet, Oral, QDAY PRN **AND** magnesium hydroxide (MILK OF MAGNESIA) suspension 30 mL, 30 mL, Oral, QDAY PRN **AND** bisacodyl (DULCOLAX) suppository 10 mg, 10 mg, Rectal, QDAY PRN, Lorrie Munoz M.D.  •  enoxaparin (LOVENOX) inj 40 mg, 40 mg, Subcutaneous, DAILY, Lorrie Munoz M.D., 40 mg at 01/12/18 0844  •  acetaminophen (TYLENOL) tablet 650 mg, 650 mg, Oral, Q6HRS PRN, Lorrie Munoz M.D., 650 mg at 01/11/18 2033  •  aspirin (ASA) chewable tab 324 mg, 324 mg, Oral, DAILY, Lorrie Munoz M.D., 324 mg at 01/12/18 0843  •  pneumococcal 13-Jaylene Conj Vacc (PREVNAR 13) syringe 0.5 mL, 0.5 mL, Intramuscular, Once PRN, Damaris Kirk M.D.  •  Respiratory Care per Protocol, , Nebulization, Continuous RT, Kathi Lindo M.D.  •  ipratropium-albuterol (DUONEB) nebulizer solution 3 mL, 3 mL, Nebulization, Q4HRS (RT),  "Kathi Lindo M.D., Stopped at 01/12/18 0653  •  ondansetron (ZOFRAN ODT) dispertab 4 mg, 4 mg, Oral, Q4HRS PRN, Lorrie Munoz M.D., 4 mg at 01/11/18 2034  Social History     Social History   • Marital status: Single     Spouse name: N/A   • Number of children: N/A   • Years of education: N/A     Occupational History   • Not on file.     Social History Main Topics   • Smoking status: Former Smoker     Years: 5.00   • Smokeless tobacco: Former User      Comment: 25 years ago   • Alcohol use No   • Drug use: No   • Sexual activity: Not on file     Other Topics Concern   • Not on file     Social History Narrative    Resides in assisted living facility.      History reviewed. No pertinent family history.  Past Surgical History:   Procedure Laterality Date   • GYN SURGERY      hysterectomy,   • OTHER      polynidal cyst removed.   • OTHER ABDOMINAL SURGERY      ada         Physical Exam:  /78   Pulse 98   Temp 37.1 °C (98.7 °F)   Resp 16   Ht 1.676 m (5' 6\")   Wt 75.6 kg (166 lb 10.7 oz)   SpO2 94%   Breastfeeding? No   BMI 26.90 kg/m²   Gen: NAD, pleasant, alert and oriented X4.  HEENT: EOMI, nystagmus on lateral gaze bilaterally,   Neuro: CN2-12 grossly intact, equal strength in LE bilaterally, negative babinski bilaterally 2+ patellar and 1+ achilles DTR bilaterally. Dysmetria noted with left hand on finger to nose testing and some issues with rapid alteranting movements on left. Her gait is wide based but overall steady without an assist device. She cannot stand with her feet together, suggesting a postivie romberg. Right sided cerebellar testing WNL.   CV: Irregular normal rate, 2+ DP and radial pulses bilaterally  Ext: no edema, clubbing or cyanosis  Abd: soft non tender, obese.     Activity Level:   Light    Activities of Daily Living:   Independent  This is a reflection of patient's normal baseline.     Delirium Screen: negative, able to say days of week forwares and backwards. No " observational signs to suggest inattention or disorganized thinking.   Vision Screen: grossly inact  Hearing Screen:grossly intact  Ambulation screen: see gait above    Labs: labs reviewed in EPIC. Notable for Moderate neutropenia.     Assessment: 88 y/o F admitted with N/V/D type illness that has resoved with contined admission with concerns for safety.       Plan:  Level of Care - the patient appears to be at an appropriate level of care. She remains a fall risk, but she is able to obtain and understand the infromation related to fall risks and can make the decision to go home. We discussed a home safety evaluation and possible continuing to exercise with therapy at discharge, which the patient remarked she would be open to. Discussed possible need for increase care needs in future and patient was resistant to that discussion. The patient appears to have capacity and can be discharged home with outpatient physician follow up, from a Geriatric perspective.     This patient does not appear to have dementia on preliminary exam today and this was removed from her history. She unlikely has a medical history as she has not seen a physician in years.     Falls secondary to imbalance with resultant soft tissue injury.   -patient with imbalance. Orthostatic VS from laying to sitting negative. CT head on admission consistent with old microvascular ischemic changes.  Given nystagmus and cerebellar signs on exam would recommend MRI brain w/wo contrast (she could have a MOL) This can be done as an outpatient as her symptoms were not the reason fro admission and are not significantly effecting her QOL, per the patient. She has had slowly progressive symptoms and this does not appear acute.   -Recommend checking b12, RPR if not already completed as reversible causes of her symptoms.    -discussed waiting a few seconds prior to moving after postion changes, discussed getting a home safety evaluation to get bars for the wall to  minimize harms in her home, discussed weight bearing and balance exercises as likely being beneficial for her. Discussed not   -PT will d/c with walker    Bone health  -recommend checking 25OH vitamin D level and outpatient DEXA scan with consideration of bisphosphonate therapy if osteoporosis is present.    -encouraged weight bearing exercise as above.     Bicytopenia  Leukopenia/moderate neutropenia- per primary team. Patient with new cough, could be reactive,query reaction to a new medication (enoxparin). Needs to be monitored as outpatient. Consider checking HIV in outpatient.     Advance care Planning - discussed POLST with patient. She would like to continue to live. She is a full code. I provided her with a blank copy of the POLST. Recommend she follow up with a new PCP which they plan to do within a week of discharge.     permanent a fib - patient defers sytemic anticoag. Higher risk for developing vascular dementia. She is rate controlled.     Thank you for this interesting consult. We will sign off. Please let us know if you have any questions.       Charlie Hines M.D.  Attending  [facility]  Available Monday-Friday 8:00-5:00 (excudling holidays)

## 2018-01-12 NOTE — CARE PLAN
Problem: Safety  Goal: Will remain free from injury  Outcome: PROGRESSING AS EXPECTED      Problem: Mobility  Goal: Risk for activity intolerance will decrease  Outcome: PROGRESSING AS EXPECTED

## 2018-01-12 NOTE — PROGRESS NOTES
Called and talk to Dr. Lindo concerning patient want to leave AMA, Dr. Lindo will come and talk to patient.

## 2018-01-12 NOTE — DISCHARGE PLANNING
Medical Social Work    SW met w/ pt at bedside and explained Choice form for DME providers. Pt states that her son was looking into buying her a walker and SW explained that we could get one through her insurance. Pt agreeable and signed Choice for [facility] and SW faxed to [facility] ([phone]).

## 2018-01-12 NOTE — PROGRESS NOTES
Medina from Lab called with critical white count result of 2.4 at 0405. Critical lab result read back to Medina.   Paged [facility].  Dr. Munoz notified of critical lab result at 2.4.  Critical lab result read back by Dr. Munoz.

## 2018-01-13 NOTE — DISCHARGE PLANNING
Medical Social Work    Late Entry: SW met w/ pt at bedside this afternoon to discuss HH Choice and inquired about pt's PCP. Pt stated that she is agreeable to HH and that her son is setting her up with a PCP. Pt did not have the name of the PCP and SW called and left a message with Nir Luevano ([phone]) in an attempt to obtain name of PCP. JOSHUA also called Scheduling [phone] but was informed that due to pt's insurance not being contracted with [facility] that the pt would need to look for a PCP contracted w/ [facility] or [facility] and then follow up with getting Home Health with that PCP. JOSHUA relayed information to pt and encouraged pt to follow up with PCP re: Home Health.

## 2018-01-13 NOTE — PROGRESS NOTES
Discharging Patient home per physician order.  Discharged with son  Demonstrated understanding of discharge instructions, follow up appointments, home medications, prescriptions, and nursing care instructions for home.  Ambulating with assistance, voiding without difficulty, pain well controlled, tolerating oral medications, oxygen saturation greater than 90% , tolerating diet.   Educational handouts dizziness given and discussed.  Verbalized understanding of discharge instructions and educational handouts.  All questions answered.  Belongings with patient at time of discharge.

## 2018-01-20 LAB — EKG IMPRESSION: NORMAL

## 2018-02-01 ENCOUNTER — HOME HEALTH ADMISSION (OUTPATIENT)
Dept: HOME HEALTH SERVICES | Facility: HOME HEALTHCARE | Age: 83
End: 2018-02-01
Payer: COMMERCIAL

## 2018-02-01 ENCOUNTER — OFFICE VISIT (OUTPATIENT)
Dept: MEDICAL GROUP | Facility: MEDICAL CENTER | Age: 83
End: 2018-02-01
Payer: COMMERCIAL

## 2018-02-01 VITALS
BODY MASS INDEX: 25.51 KG/M2 | HEART RATE: 76 BPM | OXYGEN SATURATION: 93 % | TEMPERATURE: 98.6 F | WEIGHT: 158.73 LBS | SYSTOLIC BLOOD PRESSURE: 110 MMHG | DIASTOLIC BLOOD PRESSURE: 74 MMHG | HEIGHT: 66 IN | RESPIRATION RATE: 16 BRPM

## 2018-02-01 DIAGNOSIS — Z76.89 ESTABLISHING CARE WITH NEW DOCTOR, ENCOUNTER FOR: ICD-10-CM

## 2018-02-01 DIAGNOSIS — I48.20 CHRONIC ATRIAL FIBRILLATION (HCC): Chronic | ICD-10-CM

## 2018-02-01 DIAGNOSIS — D70.8 OTHER NEUTROPENIA (HCC): ICD-10-CM
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
## 2018-02-01 DIAGNOSIS — R42 DIZZINESS: ICD-10-CM

## 2018-02-01 PROBLEM — R82.71 ASYMPTOMATIC BACTERIURIA: Status: RESOLVED | Noted: 2018-01-11 | Resolved: 2018-02-01

## 2018-02-01 PROBLEM — R53.1 GENERALIZED WEAKNESS: Status: RESOLVED | Noted: 2018-01-11 | Resolved: 2018-02-01

## 2018-02-01 PROCEDURE — 99214 OFFICE O/P EST MOD 30 MIN: CPT | Performed by: INTERNAL MEDICINE

## 2018-02-01 NOTE — PROGRESS NOTES
Subjective:   Sinyd Lyons is a 86 y.o. female here today to establish care and       Chief complaint: Dizziness         1. Establishing care with new doctor, encounter for    Patient has not seen a physician for over 8 years although she has lived in [place] for many. She was recently hospitalized at [facility] with dizziness, malaise, and was felt to be mildly dehydrated. They noted leukopenia and nystagmus on physical exam and recommended follow-up with a PCP. Patient is here today with her son, who notes along with the patient for the last several months she has been a little bit weaker, with some mild ataxia. She recently moved to an assisted living facility at the [facility], however she is now felt like going down for meals for the last month or so. She denies abdominal pain, weight loss, shortness of breath, or chest pain.   CT of head revealed small vessel ischemic changes otherwise was negative.  2. Chronic atrial fibrillation (CMS-[facility])    Patient has had an irregular heart beat for many years per her and her son. She is not on oral anticoagulation due to her fall risk, and that she take a full-strength aspirin.    3. Dizziness    As above, this is been a chronic problem for the patient. She had a workup done 8 years ago due to dizziness which was completely negative. She saw several specialists at that time.    4. Fall, subsequent encounter    Patient has fallen several times, she fractured a rib couple of years ago. She denies history of osteoporosis or osteopenia.    5. Other neutropenia (CMS-[facility])    Patient had a CBC as low as 2.4 when in the hospital, recommendation was for just a repeat CBC.      Current medicines (including changes today)  Current Outpatient Prescriptions   Medication Sig Dispense Refill   • CALCIUM PO Take  by mouth.     • aspirin 81 MG tablet Take 81 mg by mouth every day.       No current facility-administered medications for this visit.      She  has a past medical history of Afib  (CMS-HCC) and CATARACT. She also has no past medical history of Angina; Arthritis; ASTHMA; Backpain; Bronchitis; Cancer (CMS-HCC); Congestive heart failure (CMS-HCC); COPD; Diabetes; Dialysis; Fall; Glaucoma; Heart murmur; Heart valve disease; Hypertension; Indigestion; Infectious disease; Jaundice; Myocardial infarct; Other specified symptom associated with female genital organs; Pacemaker; Personal history of venous thrombosis and embolism; Pneumonia; Renal disorder; Rheumatic fever; Seizure (CMS-HCC); Stroke (CMS-HCC); Unspecified disorder of thyroid; Unspecified hemorrhagic conditions; or Unspecified urinary incontinence.    Social History     Social History   • Marital status: Single     Spouse name: N/A   • Number of children: N/A   • Years of education: N/A     Social History Main Topics   • Smoking status: Former Smoker     Years: 5.00   • Smokeless tobacco: Former User      Comment: 25 years ago   • Alcohol use No   • Drug use: No   • Sexual activity: Not on file     Other Topics Concern   • Not on file     Social History Narrative    Resides in assisted living facility.      No family history on file.    ROS       - Constitutional: Negative for fever, chills, unexpected weight change, and fatigue/generalized weakness.     - HEENT: Negative for headaches, vision changes      - Respiratory: Negative for cough, Shortness of breath    - Cardiovascular: Negative for chest pain and bilateral lower extremity edema.     - Gastrointestinal: Negative for heartburn,  abdominal pain,  diarrhea, constipation     - Genitourinary: Negative for dysuria  and urinary incontinence.    - Musculoskeletal: Negative for  back pain and joint pain.     - Skin: Negative for rash     - Neurological: Negative for dizziness,  tremors, focal weakness     - Psychiatric/Behavioral: Negative for depression and memory loss.             Objective:     Blood pressure 110/74, pulse 76, temperature 37 °C (98.6 °F), resp. rate 16, height 1.676  "m (5' 6\"), weight 72 kg (158 lb 11.7 oz), SpO2 93 %, not currently breastfeeding. Body mass index is 25.62 kg/m².     Physical Exam: Accompanied by son  Constitutional: Alert, no distress.  Skin: Warm, dry, good turgor, no rashes in visible areas.  Eye: Equal, round and reactive, conjunctiva clear, lids normal.  ENMT: Lips without lesions, good dentition, oropharynx clear. Hearing grossly intact.  Neck: No masses, no thyromegaly. No cervical or supraclavicular lymphadenopathy  Respiratory: Unlabored respiratory effort, lungs clear to auscultation, no wheezes, no rhonchi.  Cardiovascular: Irregularly irregular, without murmur, no edema.  Abdomen: Soft, non-tender, no masses, no hepatosplenomegaly.  Psych: Alert and oriented x3, normal affect and mood. Insight and judgment good            Assessment and Plan:   The following treatment plan was discussed    1. Establishing care with new doctor, encounter for        2. Chronic atrial fibrillation (CMS-HCC)    Stable, rate controlled, continue aspirin 325 mg daily.    3. Dizziness    I believe patient would benefit from physical therapy, she is unable to drive therefore we will ask for home health PT.  - REFERRAL TO HOME HEALTH    4. Fall, subsequent encounter      - REFERRAL TO HOME HEALTH    5. Other neutropenia (CMS-HCC)      - CBC WITH DIFFERENTIAL; Future      Followup: Return in about 3 months (around 5/1/2018).         "

## 2018-02-05 ENCOUNTER — HOME CARE VISIT (OUTPATIENT)
Dept: HOME HEALTH SERVICES | Facility: HOME HEALTHCARE | Age: 83
End: 2018-02-05
Payer: COMMERCIAL

## 2018-02-05 VITALS
SYSTOLIC BLOOD PRESSURE: 138 MMHG | BODY MASS INDEX: 27.16 KG/M2 | RESPIRATION RATE: 16 BRPM | HEIGHT: 65 IN | WEIGHT: 163 LBS | DIASTOLIC BLOOD PRESSURE: 60 MMHG | OXYGEN SATURATION: 95 %

## 2018-02-05 PROCEDURE — G0493 RN CARE EA 15 MIN HH/HOSPICE: HCPCS

## 2018-02-05 PROCEDURE — 665998 HH PPS REVENUE CREDIT

## 2018-02-05 PROCEDURE — 665001 SOC-HOME HEALTH

## 2018-02-05 PROCEDURE — 665999 HH PPS REVENUE DEBIT

## 2018-02-05 SDOH — ECONOMIC STABILITY: HOUSING INSECURITY: UNSAFE COOKING RANGE AREA: 0

## 2018-02-05 SDOH — ECONOMIC STABILITY: HOUSING INSECURITY: UNSAFE APPLIANCES: 0

## 2018-02-05 ASSESSMENT — ACTIVITIES OF DAILY LIVING (ADL)
HOME_HEALTH_OASIS: 02
HOME_HEALTH_OASIS: 01
OASIS_M1830: 03

## 2018-02-05 ASSESSMENT — ENCOUNTER SYMPTOMS
VOMITING: DENIES
NAUSEA: DENIES
SHORTNESS OF BREATH: T

## 2018-02-05 ASSESSMENT — PATIENT HEALTH QUESTIONNAIRE - PHQ9
1. LITTLE INTEREST OR PLEASURE IN DOING THINGS: 00
2. FEELING DOWN, DEPRESSED, IRRITABLE, OR HOPELESS: 00

## 2018-02-06 ENCOUNTER — HOME CARE VISIT (OUTPATIENT)
Dept: HOME HEALTH SERVICES | Facility: HOME HEALTHCARE | Age: 83
End: 2018-02-06
Payer: COMMERCIAL

## 2018-02-06 ENCOUNTER — TELEPHONE (OUTPATIENT)
Dept: VASCULAR LAB | Facility: MEDICAL CENTER | Age: 83
End: 2018-02-06

## 2018-02-06 VITALS
TEMPERATURE: 97.6 F | SYSTOLIC BLOOD PRESSURE: 130 MMHG | HEART RATE: 69 BPM | OXYGEN SATURATION: 96 % | RESPIRATION RATE: 16 BRPM | DIASTOLIC BLOOD PRESSURE: 81 MMHG

## 2018-02-06 VITALS
HEART RATE: 89 BPM | OXYGEN SATURATION: 99 % | DIASTOLIC BLOOD PRESSURE: 70 MMHG | SYSTOLIC BLOOD PRESSURE: 130 MMHG | TEMPERATURE: 97.2 F | RESPIRATION RATE: 16 BRPM

## 2018-02-06 PROCEDURE — G0151 HHCP-SERV OF PT,EA 15 MIN: HCPCS

## 2018-02-06 PROCEDURE — G0152 HHCP-SERV OF OT,EA 15 MIN: HCPCS

## 2018-02-06 PROCEDURE — 665998 HH PPS REVENUE CREDIT

## 2018-02-06 PROCEDURE — 665999 HH PPS REVENUE DEBIT

## 2018-02-06 SDOH — ECONOMIC STABILITY: HOUSING INSECURITY: UNSAFE COOKING RANGE AREA: 0

## 2018-02-06 SDOH — ECONOMIC STABILITY: HOUSING INSECURITY: UNSAFE APPLIANCES: 0

## 2018-02-06 ASSESSMENT — ACTIVITIES OF DAILY LIVING (ADL)
GROOMING_ASSISTANCE: 0
DRESSING_UB_ASSISTANCE: 0
DRESSING_LB_ASSISTANCE: 0
BATHING_ASSISTANCE: 1
IADLS_COMMENTS: <!--EPICS-->SEE OT REPORT<!--EPICE-->
TOILETING_ASSISTANCE: 0
TELEPHONE_ASSISTANCE: 1
EATING_ASSISTANCE: 0
ORAL_CARE_ASSISTANCE: 0

## 2018-02-06 ASSESSMENT — ENCOUNTER SYMPTOMS: DEPRESSED MOOD: 1

## 2018-02-07 PROCEDURE — 665998 HH PPS REVENUE CREDIT

## 2018-02-07 PROCEDURE — 665999 HH PPS REVENUE DEBIT

## 2018-02-08 ENCOUNTER — HOME CARE VISIT (OUTPATIENT)
Dept: HOME HEALTH SERVICES | Facility: HOME HEALTHCARE | Age: 83
End: 2018-02-08
Payer: COMMERCIAL

## 2018-02-08 VITALS
TEMPERATURE: 98.6 F | HEART RATE: 68 BPM | OXYGEN SATURATION: 96 % | RESPIRATION RATE: 17 BRPM | DIASTOLIC BLOOD PRESSURE: 78 MMHG | SYSTOLIC BLOOD PRESSURE: 141 MMHG

## 2018-02-08 PROCEDURE — 665998 HH PPS REVENUE CREDIT

## 2018-02-08 PROCEDURE — G0152 HHCP-SERV OF OT,EA 15 MIN: HCPCS

## 2018-02-08 PROCEDURE — 665999 HH PPS REVENUE DEBIT

## 2018-02-09 ENCOUNTER — HOME CARE VISIT (OUTPATIENT)
Dept: HOME HEALTH SERVICES | Facility: HOME HEALTHCARE | Age: 83
End: 2018-02-09
Payer: COMMERCIAL

## 2018-02-09 VITALS
TEMPERATURE: 97 F | RESPIRATION RATE: 15 BRPM | HEART RATE: 65 BPM | OXYGEN SATURATION: 99 % | SYSTOLIC BLOOD PRESSURE: 128 MMHG | DIASTOLIC BLOOD PRESSURE: 68 MMHG

## 2018-02-09 PROCEDURE — G0151 HHCP-SERV OF PT,EA 15 MIN: HCPCS

## 2018-02-09 PROCEDURE — 665998 HH PPS REVENUE CREDIT

## 2018-02-09 PROCEDURE — 665999 HH PPS REVENUE DEBIT

## 2018-02-10 PROCEDURE — 665998 HH PPS REVENUE CREDIT

## 2018-02-10 PROCEDURE — 665999 HH PPS REVENUE DEBIT

## 2018-02-11 PROCEDURE — 665999 HH PPS REVENUE DEBIT

## 2018-02-11 PROCEDURE — 665998 HH PPS REVENUE CREDIT

## 2018-02-12 ENCOUNTER — HOME CARE VISIT (OUTPATIENT)
Dept: HOME HEALTH SERVICES | Facility: HOME HEALTHCARE | Age: 83
End: 2018-02-12
Payer: COMMERCIAL

## 2018-02-12 VITALS
DIASTOLIC BLOOD PRESSURE: 85 MMHG | SYSTOLIC BLOOD PRESSURE: 133 MMHG | TEMPERATURE: 97.6 F | RESPIRATION RATE: 18 BRPM | OXYGEN SATURATION: 98 % | HEART RATE: 75 BPM

## 2018-02-12 PROCEDURE — 665999 HH PPS REVENUE DEBIT

## 2018-02-12 PROCEDURE — G0152 HHCP-SERV OF OT,EA 15 MIN: HCPCS

## 2018-02-12 PROCEDURE — 665998 HH PPS REVENUE CREDIT

## 2018-02-13 ENCOUNTER — HOME CARE VISIT (OUTPATIENT)
Dept: HOME HEALTH SERVICES | Facility: HOME HEALTHCARE | Age: 83
End: 2018-02-13
Payer: COMMERCIAL

## 2018-02-13 PROCEDURE — 665998 HH PPS REVENUE CREDIT

## 2018-02-13 PROCEDURE — 665999 HH PPS REVENUE DEBIT

## 2018-02-14 ENCOUNTER — HOME CARE VISIT (OUTPATIENT)
Dept: HOME HEALTH SERVICES | Facility: HOME HEALTHCARE | Age: 83
End: 2018-02-14
Payer: COMMERCIAL

## 2018-02-14 PROCEDURE — 665999 HH PPS REVENUE DEBIT

## 2018-02-14 PROCEDURE — 665998 HH PPS REVENUE CREDIT

## 2018-02-15 PROCEDURE — G0180 MD CERTIFICATION HHA PATIENT: HCPCS | Performed by: INTERNAL MEDICINE

## 2018-02-15 PROCEDURE — 665999 HH PPS REVENUE DEBIT

## 2018-02-15 PROCEDURE — 665998 HH PPS REVENUE CREDIT

## 2018-02-16 PROCEDURE — 665999 HH PPS REVENUE DEBIT

## 2018-02-16 PROCEDURE — 665998 HH PPS REVENUE CREDIT

## 2018-02-17 PROCEDURE — 665998 HH PPS REVENUE CREDIT

## 2018-02-17 PROCEDURE — 665999 HH PPS REVENUE DEBIT

## 2018-02-18 ENCOUNTER — HOME CARE VISIT (OUTPATIENT)
Dept: HOME HEALTH SERVICES | Facility: HOME HEALTHCARE | Age: 83
End: 2018-02-18
Payer: COMMERCIAL

## 2018-02-18 VITALS
RESPIRATION RATE: 18 BRPM | HEART RATE: 71 BPM | SYSTOLIC BLOOD PRESSURE: 150 MMHG | TEMPERATURE: 97.5 F | OXYGEN SATURATION: 94 % | DIASTOLIC BLOOD PRESSURE: 82 MMHG

## 2018-02-18 PROCEDURE — 665998 HH PPS REVENUE CREDIT

## 2018-02-18 PROCEDURE — 665997 HH PPS REVENUE ADJ

## 2018-02-18 PROCEDURE — G0152 HHCP-SERV OF OT,EA 15 MIN: HCPCS

## 2018-02-18 PROCEDURE — 665999 HH PPS REVENUE DEBIT

## 2018-02-18 SDOH — ECONOMIC STABILITY: HOUSING INSECURITY: UNSAFE APPLIANCES: 0

## 2018-02-18 SDOH — ECONOMIC STABILITY: HOUSING INSECURITY: UNSAFE COOKING RANGE AREA: 0

## 2018-02-18 ASSESSMENT — ACTIVITIES OF DAILY LIVING (ADL)
HOME_HEALTH_OASIS: 00
OASIS_M1830: 00

## 2018-02-19 PROCEDURE — 665998 HH PPS REVENUE CREDIT

## 2018-02-19 PROCEDURE — 665999 HH PPS REVENUE DEBIT

## 2018-02-20 PROCEDURE — 665998 HH PPS REVENUE CREDIT

## 2018-02-20 PROCEDURE — 665999 HH PPS REVENUE DEBIT

## 2018-02-21 PROCEDURE — 665999 HH PPS REVENUE DEBIT

## 2018-02-21 PROCEDURE — 665998 HH PPS REVENUE CREDIT

## 2018-02-22 PROCEDURE — 665999 HH PPS REVENUE DEBIT

## 2018-02-22 PROCEDURE — 665998 HH PPS REVENUE CREDIT

## 2018-02-23 PROCEDURE — 665999 HH PPS REVENUE DEBIT

## 2018-02-23 PROCEDURE — 665998 HH PPS REVENUE CREDIT

## 2018-02-24 PROCEDURE — 665998 HH PPS REVENUE CREDIT

## 2018-02-24 PROCEDURE — 665999 HH PPS REVENUE DEBIT

## 2018-02-25 PROCEDURE — 665998 HH PPS REVENUE CREDIT

## 2018-02-25 PROCEDURE — 665999 HH PPS REVENUE DEBIT

## 2018-05-03 ENCOUNTER — OFFICE VISIT (OUTPATIENT)
Dept: MEDICAL GROUP | Facility: MEDICAL CENTER | Age: 83
End: 2018-05-03
Payer: COMMERCIAL

## 2018-05-03 VITALS
HEIGHT: 66 IN | RESPIRATION RATE: 20 BRPM | BODY MASS INDEX: 26.22 KG/M2 | SYSTOLIC BLOOD PRESSURE: 116 MMHG | WEIGHT: 163.14 LBS | OXYGEN SATURATION: 95 % | DIASTOLIC BLOOD PRESSURE: 78 MMHG | TEMPERATURE: 97.7 F | HEART RATE: 68 BPM

## 2018-05-03 DIAGNOSIS — D70.8 OTHER NEUTROPENIA (HCC): ICD-10-CM

## 2018-05-03 DIAGNOSIS — I48.20 CHRONIC ATRIAL FIBRILLATION (HCC): Chronic | ICD-10-CM

## 2018-05-03 PROBLEM — R42 DIZZINESS: Status: RESOLVED | Noted: 2018-01-11 | Resolved: 2018-05-03
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
## 2018-05-03 PROBLEM — H55.00 NYSTAGMUS: Status: RESOLVED | Noted: 2018-01-11 | Resolved: 2018-05-03

## 2018-05-03 PROCEDURE — 99213 OFFICE O/P EST LOW 20 MIN: CPT | Performed by: INTERNAL MEDICINE

## 2018-05-03 RX ORDER — ASPIRIN 325 MG
81 TABLET ORAL DAILY
COMMUNITY
End: 2020-01-01

## 2018-05-03 ASSESSMENT — PATIENT HEALTH QUESTIONNAIRE - PHQ9: CLINICAL INTERPRETATION OF PHQ2 SCORE: 0

## 2018-05-03 NOTE — PROGRESS NOTES
Chief Complaint   Patient presents with   • Dizziness     falls     Chief complaint: Atrial fibrillation      HISTORY OF PRESENT ILLNESS: Patient is a 86 y.o. female patient who presents today to discuss the evaluation and management of:    Three-month follow-up    1. Chronic atrial fibrillation (HCC)    Patient has a long history of erratic heartbeat, she was found to be in A. fib when she was hospitalized in January of this year. She has never been anticoagulated due to her advanced age and history of falls. She currently is doing extremely well, she moved out of the [facility] assisted living about a month ago into her own [facility] where she is very happy. She is back to driving and is independent in all of her ADLs. She has not recently had a fall. She had several sessions of physical and occupational therapy and was doing so well that she was discharged.  Her only current medication is full strength aspirin and calcium and vitamin D supplement.    2. Other neutropenia (HCC)    When in the hospital patient was noted to have mild neutropenia with WBC 2.5. This was confirmed upon repeat with W BC 2.9 and ANC about 1100. Patient does not have a history of frequent or severe infections. Hemoglobin was normal.        Patient Active Problem List    Diagnosis Date Noted   • Leukopenia 01/12/2018     Priority: Medium   • Chronic atrial fibrillation (HCC) 01/11/2018     Priority: Medium        Allergies:Patient has no known allergies.    Current meds including changes today  Current Outpatient Prescriptions   Medication Sig Dispense Refill   • aspirin (ASA) 325 MG Tab Take 325 mg by mouth every day.     • Calcium Carb-Cholecalciferol (CALCIUM + D3) 600-800 MG-UNIT Tab Take 1 Tab by mouth every day.       No current facility-administered medications for this visit.      Social History   Substance Use Topics   • Smoking status: Former Smoker     Years: 5.00   • Smokeless tobacco: Former User      Comment: 25 years ago   •  "Alcohol use No     Social History     Social History Narrative    Resides in assisted living facility.        No family history on file.    Review of Systems:  No chest pain, No shortness of breath, No dyspnea on exertion  Gastrointestinal ROS: No abdominal pain, No nausea, vomiting, diarrhea, or constipation        Exam:      Blood pressure 116/78, pulse 68, temperature 36.5 °C (97.7 °F), resp. rate 20, height 1.676 m (5' 6\"), weight 74 kg (163 lb 2.3 oz), SpO2 95 %, not currently breastfeeding.  General:  Well nourished, well developed female in NAD affect and mood within normal limits. Pleasant and alert appears younger than stated age   Head is grossly normal.  Neck: Supple without adenopathy  Pulmonary: Clear to ausculation.  Normal effort. No rales, rhonchi, or wheezing.  Cardiovascular: Irregular rate and rhythm without murmur.   Extremities: no clubbing, cyanosis, or edema.  Neuro: moves all extremities symmetrically    Please note that this dictation was created using voice recognition software. I have made every reasonable attempt to correct obvious errors, but I expect that there are errors of grammar and possibly content that I did not discover before finalizing the note.    Assessment/Plan:  1. Chronic atrial fibrillation (HCC)    Rate controlled without medication, continue full-strength aspirin.    2. Other neutropenia (HCC)    WBC stable, will monitor occasionally with CBC.    Followup: Return in about 6 months (around 11/3/2018).        "

## 2018-11-06 ENCOUNTER — OFFICE VISIT (OUTPATIENT)
Dept: MEDICAL GROUP | Facility: MEDICAL CENTER | Age: 83
End: 2018-11-06
Payer: COMMERCIAL

## 2018-11-06 VITALS
HEART RATE: 64 BPM | HEIGHT: 66 IN | SYSTOLIC BLOOD PRESSURE: 102 MMHG | RESPIRATION RATE: 20 BRPM | DIASTOLIC BLOOD PRESSURE: 66 MMHG | OXYGEN SATURATION: 94 % | TEMPERATURE: 97.6 F | WEIGHT: 175.4 LBS | BODY MASS INDEX: 28.19 KG/M2

## 2018-11-06 DIAGNOSIS — I48.20 CHRONIC ATRIAL FIBRILLATION (HCC): Chronic | ICD-10-CM

## 2018-11-06 DIAGNOSIS — N39.41 URGE URINARY INCONTINENCE: ICD-10-CM

## 2018-11-06 DIAGNOSIS — Z00.00 PREVENTATIVE HEALTH CARE: ICD-10-CM

## 2018-11-06 PROCEDURE — 99214 OFFICE O/P EST MOD 30 MIN: CPT | Performed by: INTERNAL MEDICINE

## 2018-11-06 NOTE — PROGRESS NOTES
"Chief Complaint   Patient presents with   • Dizziness     follow up   • Urinary Frequency     \" I have an over active bladder\"     Chief complaint: 6-month follow-up of atrial fibrillation, urinary incontinence.    HISTORY OF PRESENT ILLNESS: Patient is a 87 y.o. female patient who presents today to discuss the evaluation and management of:          1. Preventative health care    Patient is generally doing very well.  She continues to live alone in a townhouse in [facility].  She is  for many years.  She continues to drive without incident.  She has annual eye exam.  She has not fallen recently.  She declines all screening tests.  We discussed bone mineral density testing, and she declines this as well.  She is taking calcium/vitamin D.  She is requesting a disability placard, as she has difficulty ambulating very far, and she uses a cane.    2. Chronic atrial fibrillation (HCC)    Long history of chronic A. fib, rate controlled without medications.  She currently takes an aspirin daily.  She has had no chest pain, palpitations, or stroke symptoms.    3. Urge urinary incontinence    Long-standing urge urinary incontinence.  She goes through about 6 pads a day.  She has to wake up once at night to urinate.  She denies stress incontinence.        Patient Active Problem List    Diagnosis Date Noted   • Leukopenia 01/12/2018     Priority: Medium   • Chronic atrial fibrillation (HCC) 01/11/2018     Priority: Medium   • Urge urinary incontinence 11/06/2018        Allergies:Patient has no known allergies.    Current meds including changes today  Current Outpatient Prescriptions   Medication Sig Dispense Refill   • aspirin (ASA) 325 MG Tab Take 325 mg by mouth every day.     • Calcium Carb-Cholecalciferol (CALCIUM + D3) 600-800 MG-UNIT Tab Take 1 Tab by mouth every day.       No current facility-administered medications for this visit.      Social History   Substance Use Topics   • Smoking status: Former Smoker     " "Years: 5.00   • Smokeless tobacco: Former User      Comment: 25 years ago   • Alcohol use No     Social History     Social History Narrative    Resides in assisted living facility.        No family history on file.    Review of Systems:  No chest pain, No shortness of breath, No dyspnea on exertion  Gastrointestinal ROS: No abdominal pain, No nausea, vomiting, diarrhea, or constipation        Exam:      Blood pressure 102/66, pulse 64, temperature 36.4 °C (97.6 °F), temperature source Temporal, resp. rate 20, height 1.676 m (5' 6\"), weight 79.6 kg (175 lb 6.4 oz), SpO2 94 %, not currently breastfeeding.  General:  Well nourished, well developed female in NAD affect and mood within normal limits  Head is grossly normal.  Neck: Supple without adenopathy  Pulmonary: Clear to ausculation.  Normal effort. No rales, rhonchi, or wheezing.  Cardiovascular: Irregular rate and rhythm without murmur.   Extremities: no clubbing, cyanosis, or edema.  Neuro: moves all extremities symmetrically    Please note that this dictation was created using voice recognition software. I have made every reasonable attempt to correct obvious errors, but I expect that there are errors of grammar and possibly content that I did not discover before finalizing the note.    Assessment/Plan:  1. Preventative health care    As above, recommend DEXA scan but patient declines.  She also declines flu shot    2. Chronic atrial fibrillation (HCC)    Rate controlled without medication, on aspirin for antiplatelet effect.    3. Urge urinary incontinence    Discussed potential use of oxybutynin, patient declines.  She will let me know if she reconsiders.    Total 30 minutes face-to-face time spent with patient, with greater than 50% of the total time discussing patient's issues and symptoms as listed above in assessment and plan, as well as managing coordination of care for future evaluation and treatment.    Followup: Return in about 6 months (around " 5/6/2019).

## 2019-05-07 ENCOUNTER — APPOINTMENT (OUTPATIENT)
Dept: MEDICAL GROUP | Facility: MEDICAL CENTER | Age: 84
End: 2019-05-07
Payer: COMMERCIAL

## 2019-11-23 ENCOUNTER — OFFICE VISIT (OUTPATIENT)
Dept: URGENT CARE | Facility: CLINIC | Age: 84
End: 2019-11-23
Payer: COMMERCIAL

## 2019-11-23 VITALS
HEART RATE: 66 BPM | DIASTOLIC BLOOD PRESSURE: 72 MMHG | TEMPERATURE: 97.6 F | OXYGEN SATURATION: 96 % | RESPIRATION RATE: 16 BRPM | SYSTOLIC BLOOD PRESSURE: 108 MMHG

## 2019-11-23 DIAGNOSIS — S61.451A CAT BITE OF RIGHT HAND, INITIAL ENCOUNTER: ICD-10-CM
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
## 2019-11-23 PROCEDURE — 90715 TDAP VACCINE 7 YRS/> IM: CPT | Performed by: NURSE PRACTITIONER

## 2019-11-23 PROCEDURE — 99214 OFFICE O/P EST MOD 30 MIN: CPT | Mod: 25 | Performed by: NURSE PRACTITIONER

## 2019-11-23 PROCEDURE — 90471 IMMUNIZATION ADMIN: CPT | Performed by: NURSE PRACTITIONER

## 2019-11-23 RX ORDER — AMOXICILLIN AND CLAVULANATE POTASSIUM 875; 125 MG/1; MG/1
1 TABLET, FILM COATED ORAL 2 TIMES DAILY
Qty: 20 TAB | Refills: 0 | Status: SHIPPED | OUTPATIENT
Start: 2019-11-23 | End: 2019-12-03

## 2019-11-23 NOTE — PROGRESS NOTES
"  Subjective:     Sindy Lyons is a 88 y.o. female who presents for Cat Bite (x yesterday, Cat bite on Rt. hand, painful and swollen \"Pt. don't remember last tetanus vaccine\")      Cat bite to right wrist yesterday morning. Pain 8/10. Took something for pain, helped some. Unknown fever, not feverish. No vomiting. No bleeding from bite, hand was more swollen. Has increasing redness.      Animal Bite   This is a new problem. The current episode started yesterday. The problem occurs constantly. The problem has been gradually worsening. Pertinent negatives include no chills, fever or rash. The symptoms are aggravated by bending. The treatment provided moderate relief.       Past Medical History:   Diagnosis Date   • Afib (HCC)    • CATARACT     removed       Past Surgical History:   Procedure Laterality Date   • GYN SURGERY      hysterectomy,   • OTHER      polynidal cyst removed.   • OTHER ABDOMINAL SURGERY      ada       Social History     Socioeconomic History   • Marital status: Single     Spouse name: Not on file   • Number of children: Not on file   • Years of education: Not on file   • Highest education level: Not on file   Occupational History   • Not on file   Social Needs   • Financial resource strain: Not on file   • Food insecurity:     Worry: Not on file     Inability: Not on file   • Transportation needs:     Medical: Not on file     Non-medical: Not on file   Tobacco Use   • Smoking status: Former Smoker     Years: 5.00   • Smokeless tobacco: Former User   • Tobacco comment: 25 years ago   Substance and Sexual Activity   • Alcohol use: No   • Drug use: No   • Sexual activity: Never   Lifestyle   • Physical activity:     Days per week: Not on file     Minutes per session: Not on file   • Stress: Not on file   Relationships   • Social connections:     Talks on phone: Not on file     Gets together: Not on file     Attends Yazidism service: Not on file     Active member of club or organization: Not on file "     Attends meetings of clubs or organizations: Not on file     Relationship status: Not on file   • Intimate partner violence:     Fear of current or ex partner: Not on file     Emotionally abused: Not on file     Physically abused: Not on file     Forced sexual activity: Not on file   Other Topics Concern   • Not on file   Social History Narrative    Resides in assisted living facility.         No family history on file.     No Known Allergies    Review of Systems   Constitutional: Negative for chills and fever.   Respiratory: Negative for shortness of breath.    Cardiovascular: Negative for palpitations.   Musculoskeletal: Negative for joint pain.   Skin: Negative for rash.        Swelling at bite site has improved from what it initially was.   Neurological: Negative for sensory change.   All other systems reviewed and are negative.       Objective:   /72 (BP Location: Left arm, Patient Position: Sitting, BP Cuff Size: Adult)   Pulse 66   Temp 36.4 °C (97.6 °F) (Temporal)   Resp 16   SpO2 96%     Physical Exam  Vitals signs reviewed.   Constitutional:       General: She is not in acute distress.     Appearance: Normal appearance. She is well-developed.   HENT:      Head: Normocephalic and atraumatic.      Right Ear: External ear normal.      Left Ear: External ear normal.      Nose: Nose normal.   Eyes:      Conjunctiva/sclera: Conjunctivae normal.   Neck:      Musculoskeletal: Normal range of motion.   Cardiovascular:      Rate and Rhythm: Normal rate.   Pulmonary:      Effort: Pulmonary effort is normal.   Musculoskeletal: Normal range of motion.         General: Swelling and tenderness present.      Right wrist: She exhibits tenderness and swelling. She exhibits normal range of motion, no bony tenderness and no crepitus.      Right hand: She exhibits tenderness and swelling. Normal sensation noted. Normal strength noted.   Skin:     General: Skin is warm and dry.      Capillary Refill: Capillary  refill takes less than 2 seconds.      Findings: Erythema and wound present. No rash.      Comments: 1.5 cm linear wound and puncture to dorsal radial aspect of hand/wrist.  11 cm area of  errythema in length to dorsal hand and wrist, with swelling.    Neurological:      General: No focal deficit present.      Mental Status: She is alert and oriented to person, place, and time.      GCS: GCS eye subscore is 4. GCS verbal subscore is 5. GCS motor subscore is 6.   Psychiatric:         Mood and Affect: Mood normal.         Speech: Speech normal.         Behavior: Behavior normal.         Thought Content: Thought content normal.         Judgment: Judgment normal.         Assessment/Plan:   1. Cat bite of right hand, initial encounter  - amoxicillin-clavulanate (AUGMENTIN) 875-125 MG Tab; Take 1 Tab by mouth 2 times a day for 10 days.  Dispense: 20 Tab; Refill: 0  - Tdap =>8yo IM  - cefTRIAXone (ROCEPHIN) 1 g, lidocaine (XYLOCAINE) 1 % 3.6 mL for IM use  -Wound Check in 2 day.  -Perimeter of erythema marked for monitoring.  -RICE: Rest, Ice, Elevate hand.   -Strict ER precautions: advancing redness or swelling, fever, weakness, tachycardia, dizziness, severe uncontrolled pain, nausea or vomiting.    The wound is cleansed, and dressed. The patient is alerted to watch for any signs of infection (redness, pus, pain, increased swelling or fever) and follow up if such occurs. Home wound care instructions are provided. Tetanus vaccination status reviewed: Td vaccination indicated and given today.    Differential diagnosis, natural history, supportive care, and indications for immediate follow-up discussed.

## 2019-11-24 ASSESSMENT — ENCOUNTER SYMPTOMS
SHORTNESS OF BREATH: 0
CHILLS: 0
FEVER: 0
SENSORY CHANGE: 0
PALPITATIONS: 0

## 2019-11-25 ENCOUNTER — OFFICE VISIT (OUTPATIENT)
Dept: URGENT CARE | Facility: CLINIC | Age: 84
End: 2019-11-25
Payer: COMMERCIAL

## 2019-11-25 VITALS
HEIGHT: 66 IN | TEMPERATURE: 97.7 F | BODY MASS INDEX: 28.12 KG/M2 | RESPIRATION RATE: 16 BRPM | HEART RATE: 68 BPM | OXYGEN SATURATION: 96 % | WEIGHT: 175 LBS | SYSTOLIC BLOOD PRESSURE: 120 MMHG | DIASTOLIC BLOOD PRESSURE: 70 MMHG
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
## 2019-11-25 PROCEDURE — 99212 OFFICE O/P EST SF 10 MIN: CPT | Performed by: PHYSICIAN ASSISTANT

## 2019-11-26 NOTE — PROGRESS NOTES
HPI:   Presents for wound check 2 days post evaluation for cat bite and initiation of antibiotics.. Wound with improving redness, no discharge noted.  Patient complains of persistent mild achy pain.    ROS:   no fever, no sensory loss, no weakness    Exam:   Gen: WDWN in no distress  Wound: well healing wound. No evidence of dehiscence or infection.  Area of erythema has all receded from prior black marker lines on skin.  Punctate wound with scab, no fluctuance, no evidence of discharge drainage, erythema appears improved.  Neuro: sensory/motor intact     A/P   Cat bite  At left first visit tetanus updated, started on Augmentin-indications to return to clinic reviewed with patient and caregiver at length  F/U prn

## 2020-01-01 ENCOUNTER — HOSPITAL ENCOUNTER (OUTPATIENT)
Facility: MEDICAL CENTER | Age: 85
End: 2020-11-07
Attending: EMERGENCY MEDICINE | Admitting: INTERNAL MEDICINE
Payer: COMMERCIAL

## 2020-01-01 ENCOUNTER — APPOINTMENT (OUTPATIENT)
Dept: RADIOLOGY | Facility: MEDICAL CENTER | Age: 85
End: 2020-01-01
Attending: EMERGENCY MEDICINE
Payer: COMMERCIAL

## 2020-01-01 VITALS
OXYGEN SATURATION: 96 % | HEIGHT: 65 IN | SYSTOLIC BLOOD PRESSURE: 126 MMHG | WEIGHT: 147.71 LBS | TEMPERATURE: 98.2 F | BODY MASS INDEX: 24.61 KG/M2 | HEART RATE: 64 BPM | DIASTOLIC BLOOD PRESSURE: 80 MMHG | RESPIRATION RATE: 16 BRPM
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
## 2020-01-01 DIAGNOSIS — R53.1 WEAKNESS: ICD-10-CM
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
## 2020-01-01 DIAGNOSIS — F03.90 DEMENTIA WITHOUT BEHAVIORAL DISTURBANCE, UNSPECIFIED DEMENTIA TYPE: ICD-10-CM

## 2020-01-01 LAB
ANION GAP SERPL CALC-SCNC: 13 MMOL/L (ref 7–16)
ANION GAP SERPL CALC-SCNC: 9 MMOL/L (ref 7–16)
APPEARANCE UR: ABNORMAL
BACTERIA #/AREA URNS HPF: ABNORMAL /HPF
BASOPHILS # BLD AUTO: 0.5 % (ref 0–1.8)
BASOPHILS # BLD AUTO: 1.4 % (ref 0–1.8)
BASOPHILS # BLD: 0.05 K/UL (ref 0–0.12)
BASOPHILS # BLD: 0.1 K/UL (ref 0–0.12)
BILIRUB UR QL STRIP.AUTO: NEGATIVE
BUN SERPL-MCNC: 14 MG/DL (ref 8–22)
BUN SERPL-MCNC: 16 MG/DL (ref 8–22)
CALCIUM SERPL-MCNC: 8.4 MG/DL (ref 8.4–10.2)
CALCIUM SERPL-MCNC: 9.3 MG/DL (ref 8.4–10.2)
CHLORIDE SERPL-SCNC: 101 MMOL/L (ref 96–112)
CHLORIDE SERPL-SCNC: 107 MMOL/L (ref 96–112)
CO2 SERPL-SCNC: 22 MMOL/L (ref 20–33)
CO2 SERPL-SCNC: 22 MMOL/L (ref 20–33)
COLOR UR: YELLOW
COMMENT 1642: NORMAL
COVID ORDER STATUS COVID19: NORMAL
CREAT SERPL-MCNC: 0.75 MG/DL (ref 0.5–1.4)
CREAT SERPL-MCNC: 0.8 MG/DL (ref 0.5–1.4)
EOSINOPHIL # BLD AUTO: 0.05 K/UL (ref 0–0.51)
EOSINOPHIL # BLD AUTO: 0.88 K/UL (ref 0–0.51)
EOSINOPHIL NFR BLD: 0.5 % (ref 0–6.9)
EOSINOPHIL NFR BLD: 12.1 % (ref 0–6.9)
EPITHELIAL CELLS RENAL  1715R: ABNORMAL /HPF
ERYTHROCYTE [DISTWIDTH] IN BLOOD BY AUTOMATED COUNT: 43.8 FL (ref 35.9–50)
ERYTHROCYTE [DISTWIDTH] IN BLOOD BY AUTOMATED COUNT: 44.6 FL (ref 35.9–50)
ERYTHROCYTE [DISTWIDTH] IN BLOOD BY AUTOMATED COUNT: 45.6 FL (ref 35.9–50)
GLUCOSE SERPL-MCNC: 114 MG/DL (ref 65–99)
GLUCOSE SERPL-MCNC: 94 MG/DL (ref 65–99)
GLUCOSE UR STRIP.AUTO-MCNC: NEGATIVE MG/DL
HCT VFR BLD AUTO: 35.4 % (ref 37–47)
HCT VFR BLD AUTO: 36 % (ref 37–47)
HCT VFR BLD AUTO: 43 % (ref 37–47)
HGB BLD-MCNC: 11.8 G/DL (ref 12–16)
HGB BLD-MCNC: 11.9 G/DL (ref 12–16)
HGB BLD-MCNC: 14.5 G/DL (ref 12–16)
IMM GRANULOCYTES # BLD AUTO: 0.02 K/UL (ref 0–0.11)
IMM GRANULOCYTES # BLD AUTO: 0.22 K/UL (ref 0–0.11)
IMM GRANULOCYTES NFR BLD AUTO: 0.3 % (ref 0–0.9)
IMM GRANULOCYTES NFR BLD AUTO: 2.2 % (ref 0–0.9)
KETONES UR STRIP.AUTO-MCNC: NEGATIVE MG/DL
LEUKOCYTE ESTERASE UR QL STRIP.AUTO: ABNORMAL
LYMPHOCYTES # BLD AUTO: 0.93 K/UL (ref 1–4.8)
LYMPHOCYTES # BLD AUTO: 1.78 K/UL (ref 1–4.8)
LYMPHOCYTES NFR BLD: 24.5 % (ref 22–41)
LYMPHOCYTES NFR BLD: 9.5 % (ref 22–41)
MCH RBC QN AUTO: 30.9 PG (ref 27–33)
MCH RBC QN AUTO: 30.9 PG (ref 27–33)
MCH RBC QN AUTO: 31.2 PG (ref 27–33)
MCHC RBC AUTO-ENTMCNC: 33.1 G/DL (ref 33.6–35)
MCHC RBC AUTO-ENTMCNC: 33.3 G/DL (ref 33.6–35)
MCHC RBC AUTO-ENTMCNC: 33.7 G/DL (ref 33.6–35)
MCV RBC AUTO: 91.5 FL (ref 81.4–97.8)
MCV RBC AUTO: 92.7 FL (ref 81.4–97.8)
MCV RBC AUTO: 94.5 FL (ref 81.4–97.8)
MICRO URNS: ABNORMAL
MONOCYTES # BLD AUTO: 0.59 K/UL (ref 0–0.85)
MONOCYTES # BLD AUTO: 0.67 K/UL (ref 0–0.85)
MONOCYTES NFR BLD AUTO: 6 % (ref 0–13.4)
MONOCYTES NFR BLD AUTO: 9.2 % (ref 0–13.4)
MUCOUS THREADS #/AREA URNS HPF: ABNORMAL /HPF
NEUTROPHILS # BLD AUTO: 3.82 K/UL (ref 2–7.15)
NEUTROPHILS # BLD AUTO: 7.94 K/UL (ref 2–7.15)
NEUTROPHILS NFR BLD: 52.5 % (ref 44–72)
NEUTROPHILS NFR BLD: 81.3 % (ref 44–72)
NITRITE UR QL STRIP.AUTO: NEGATIVE
NRBC # BLD AUTO: 0 K/UL
NRBC # BLD AUTO: 0 K/UL
NRBC BLD-RTO: 0 /100 WBC
NRBC BLD-RTO: 0 /100 WBC
PH UR STRIP.AUTO: 6 [PH] (ref 5–8)
PLATELET # BLD AUTO: 118 K/UL (ref 164–446)
PLATELET # BLD AUTO: 122 K/UL (ref 164–446)
PLATELET # BLD AUTO: 160 K/UL (ref 164–446)
PMV BLD AUTO: 11.8 FL (ref 9–12.9)
PMV BLD AUTO: 12.3 FL (ref 9–12.9)
PMV BLD AUTO: 12.7 FL (ref 9–12.9)
POTASSIUM SERPL-SCNC: 3.8 MMOL/L (ref 3.6–5.5)
POTASSIUM SERPL-SCNC: 4.3 MMOL/L (ref 3.6–5.5)
PROT UR QL STRIP: NEGATIVE MG/DL
RBC # BLD AUTO: 3.81 M/UL (ref 4.2–5.4)
RBC # BLD AUTO: 3.82 M/UL (ref 4.2–5.4)
RBC # BLD AUTO: 4.7 M/UL (ref 4.2–5.4)
RBC # URNS HPF: ABNORMAL /HPF
RBC UR QL AUTO: NEGATIVE
SARS-COV-2 RNA RESP QL NAA+PROBE: NOTDETECTED
SODIUM SERPL-SCNC: 136 MMOL/L (ref 135–145)
SODIUM SERPL-SCNC: 138 MMOL/L (ref 135–145)
SP GR UR STRIP.AUTO: 1.02
SPECIMEN SOURCE: NORMAL
WBC # BLD AUTO: 7.3 K/UL (ref 4.8–10.8)
WBC # BLD AUTO: 7.9 K/UL (ref 4.8–10.8)
WBC # BLD AUTO: 9.8 K/UL (ref 4.8–10.8)
WBC #/AREA URNS HPF: ABNORMAL /HPF

## 2020-01-01 PROCEDURE — 80048 BASIC METABOLIC PNL TOTAL CA: CPT

## 2020-01-01 PROCEDURE — 85025 COMPLETE CBC W/AUTO DIFF WBC: CPT

## 2020-01-01 PROCEDURE — 700102 HCHG RX REV CODE 250 W/ 637 OVERRIDE(OP): Performed by: INTERNAL MEDICINE

## 2020-01-01 PROCEDURE — 97165 OT EVAL LOW COMPLEX 30 MIN: CPT

## 2020-01-01 PROCEDURE — 72131 CT LUMBAR SPINE W/O DYE: CPT

## 2020-01-01 PROCEDURE — C9803 HOPD COVID-19 SPEC COLLECT: HCPCS | Performed by: INTERNAL MEDICINE

## 2020-01-01 PROCEDURE — 99217 PR OBSERVATION CARE DISCHARGE: CPT | Performed by: INTERNAL MEDICINE

## 2020-01-01 PROCEDURE — A9270 NON-COVERED ITEM OR SERVICE: HCPCS | Performed by: INTERNAL MEDICINE

## 2020-01-01 PROCEDURE — 72192 CT PELVIS W/O DYE: CPT

## 2020-01-01 PROCEDURE — 85027 COMPLETE CBC AUTOMATED: CPT

## 2020-01-01 PROCEDURE — 99285 EMERGENCY DEPT VISIT HI MDM: CPT

## 2020-01-01 PROCEDURE — 97116 GAIT TRAINING THERAPY: CPT | Mod: XU

## 2020-01-01 PROCEDURE — 99219 PR INITIAL OBSERVATION CARE,LEVL II: CPT | Performed by: INTERNAL MEDICINE

## 2020-01-01 PROCEDURE — 81001 URINALYSIS AUTO W/SCOPE: CPT

## 2020-01-01 PROCEDURE — 96374 THER/PROPH/DIAG INJ IV PUSH: CPT

## 2020-01-01 PROCEDURE — 36415 COLL VENOUS BLD VENIPUNCTURE: CPT

## 2020-01-01 PROCEDURE — 99225 PR SUBSEQUENT OBSERVATION CARE,LEVEL II: CPT | Performed by: INTERNAL MEDICINE

## 2020-01-01 PROCEDURE — 700111 HCHG RX REV CODE 636 W/ 250 OVERRIDE (IP): Performed by: EMERGENCY MEDICINE

## 2020-01-01 PROCEDURE — G0378 HOSPITAL OBSERVATION PER HR: HCPCS

## 2020-01-01 PROCEDURE — 70450 CT HEAD/BRAIN W/O DYE: CPT

## 2020-01-01 PROCEDURE — U0003 INFECTIOUS AGENT DETECTION BY NUCLEIC ACID (DNA OR RNA); SEVERE ACUTE RESPIRATORY SYNDROME CORONAVIRUS 2 (SARS-COV-2) (CORONAVIRUS DISEASE [COVID-19]), AMPLIFIED PROBE TECHNIQUE, MAKING USE OF HIGH THROUGHPUT TECHNOLOGIES AS DESCRIBED BY CMS-2020-01-R: HCPCS

## 2020-01-01 PROCEDURE — 97162 PT EVAL MOD COMPLEX 30 MIN: CPT

## 2020-01-01 PROCEDURE — 97530 THERAPEUTIC ACTIVITIES: CPT

## 2020-01-01 RX ORDER — HALOPERIDOL 5 MG/ML
1 INJECTION INTRAMUSCULAR EVERY 4 HOURS PRN
Status: DISCONTINUED | OUTPATIENT
Start: 2020-01-01 | End: 2020-01-01 | Stop reason: HOSPADM

## 2020-01-01 RX ORDER — ONDANSETRON 4 MG/1
4 TABLET, ORALLY DISINTEGRATING ORAL EVERY 4 HOURS PRN
Status: DISCONTINUED | OUTPATIENT
Start: 2020-01-01 | End: 2020-01-01 | Stop reason: HOSPADM

## 2020-01-01 RX ORDER — POLYETHYLENE GLYCOL 3350 17 G/17G
1 POWDER, FOR SOLUTION ORAL
Status: DISCONTINUED | OUTPATIENT
Start: 2020-01-01 | End: 2020-01-01 | Stop reason: HOSPADM

## 2020-01-01 RX ORDER — BISACODYL 10 MG
10 SUPPOSITORY, RECTAL RECTAL
Status: DISCONTINUED | OUTPATIENT
Start: 2020-01-01 | End: 2020-01-01 | Stop reason: HOSPADM

## 2020-01-01 RX ORDER — ONDANSETRON 2 MG/ML
4 INJECTION INTRAMUSCULAR; INTRAVENOUS EVERY 4 HOURS PRN
Status: DISCONTINUED | OUTPATIENT
Start: 2020-01-01 | End: 2020-01-01 | Stop reason: HOSPADM

## 2020-01-01 RX ORDER — AMOXICILLIN 250 MG
2 CAPSULE ORAL 2 TIMES DAILY
Status: DISCONTINUED | OUTPATIENT
Start: 2020-01-01 | End: 2020-01-01 | Stop reason: HOSPADM

## 2020-01-01 RX ORDER — ASPIRIN 325 MG
325 TABLET ORAL
COMMUNITY
End: 2021-01-01

## 2020-01-01 RX ORDER — ACETAMINOPHEN 325 MG/1
650 TABLET ORAL EVERY 6 HOURS PRN
Qty: 30 TAB | Refills: 0 | Status: SHIPPED | OUTPATIENT
Start: 2020-01-01 | End: 2021-01-01

## 2020-01-01 RX ORDER — LABETALOL HYDROCHLORIDE 5 MG/ML
10 INJECTION, SOLUTION INTRAVENOUS EVERY 4 HOURS PRN
Status: DISCONTINUED | OUTPATIENT
Start: 2020-01-01 | End: 2020-01-01 | Stop reason: HOSPADM

## 2020-01-01 RX ORDER — ACETAMINOPHEN 325 MG/1
650 TABLET ORAL EVERY 6 HOURS PRN
Status: DISCONTINUED | OUTPATIENT
Start: 2020-01-01 | End: 2020-01-01 | Stop reason: HOSPADM

## 2020-01-01 RX ADMIN — ASPIRIN 81 MG: 81 TABLET, COATED ORAL at 05:09

## 2020-01-01 RX ADMIN — ASPIRIN 81 MG: 81 TABLET, COATED ORAL at 07:36

## 2020-01-01 RX ADMIN — SENNOSIDES-DOCUSATE SODIUM TAB 8.6-50 MG 2 TABLET: 8.6-5 TAB at 05:22

## 2020-01-01 RX ADMIN — FENTANYL CITRATE 50 MCG: 50 INJECTION, SOLUTION INTRAMUSCULAR; INTRAVENOUS at 00:15

## 2020-01-01 RX ADMIN — SENNOSIDES-DOCUSATE SODIUM TAB 8.6-50 MG 2 TABLET: 8.6-5 TAB at 17:39

## 2020-01-01 RX ADMIN — ACETAMINOPHEN 650 MG: 325 TABLET, FILM COATED ORAL at 11:59

## 2020-01-01 RX ADMIN — SENNOSIDES-DOCUSATE SODIUM TAB 8.6-50 MG 2 TABLET: 8.6-5 TAB at 07:36

## 2020-01-01 RX ADMIN — ASPIRIN 81 MG: 81 TABLET, COATED ORAL at 05:22

## 2020-01-01 RX ADMIN — ACETAMINOPHEN 650 MG: 325 TABLET, FILM COATED ORAL at 16:50

## 2020-01-01 RX ADMIN — ACETAMINOPHEN 650 MG: 325 TABLET, FILM COATED ORAL at 22:57

## 2020-01-01 RX ADMIN — ASPIRIN 81 MG: 81 TABLET, COATED ORAL at 06:43

## 2020-01-01 SDOH — HEALTH STABILITY: MENTAL HEALTH: HOW OFTEN DO YOU HAVE A DRINK CONTAINING ALCOHOL?: NEVER

## 2020-01-01 SDOH — ECONOMIC STABILITY: FOOD INSECURITY: WITHIN THE PAST 12 MONTHS, THE FOOD YOU BOUGHT JUST DIDN'T LAST AND YOU DIDN'T HAVE MONEY TO GET MORE.: NEVER TRUE

## 2020-01-01 SDOH — ECONOMIC STABILITY: TRANSPORTATION INSECURITY
IN THE PAST 12 MONTHS, HAS THE LACK OF TRANSPORTATION KEPT YOU FROM MEDICAL APPOINTMENTS OR FROM GETTING MEDICATIONS?: NO

## 2020-01-01 SDOH — ECONOMIC STABILITY: FOOD INSECURITY: WITHIN THE PAST 12 MONTHS, YOU WORRIED THAT YOUR FOOD WOULD RUN OUT BEFORE YOU GOT MONEY TO BUY MORE.: NEVER TRUE

## 2020-01-01 SDOH — ECONOMIC STABILITY: TRANSPORTATION INSECURITY
IN THE PAST 12 MONTHS, HAS LACK OF TRANSPORTATION KEPT YOU FROM MEETINGS, WORK, OR FROM GETTING THINGS NEEDED FOR DAILY LIVING?: NO

## 2020-01-01 ASSESSMENT — ENCOUNTER SYMPTOMS
LOSS OF CONSCIOUSNESS: 0
TINGLING: 0
WEAKNESS: 0
FALLS: 0
DEPRESSION: 0
FEVER: 0
DIARRHEA: 0
CHILLS: 0
HEARTBURN: 0
BACK PAIN: 0
FALLS: 1
MYALGIAS: 0
ABDOMINAL PAIN: 0
CONSTIPATION: 0
ABDOMINAL PAIN: 0
BACK PAIN: 0
FEVER: 0
HALLUCINATIONS: 0
DIZZINESS: 0
SORE THROAT: 0
FOCAL WEAKNESS: 0
WEAKNESS: 1
SORE THROAT: 0
DIZZINESS: 0
VOMITING: 0
HEADACHES: 0
SPUTUM PRODUCTION: 0
BACK PAIN: 0
PALPITATIONS: 0
DEPRESSION: 0
VOMITING: 0
DEPRESSION: 0
SHORTNESS OF BREATH: 0
VOMITING: 0
HEARTBURN: 0
MYALGIAS: 0
FOCAL WEAKNESS: 0
DEPRESSION: 0
HEADACHES: 0
PALPITATIONS: 0
DIZZINESS: 0
CHILLS: 0
ABDOMINAL PAIN: 0
COUGH: 0
MEMORY LOSS: 1
PALPITATIONS: 0
FALLS: 1
NAUSEA: 0
PALPITATIONS: 0
SHORTNESS OF BREATH: 0
BLOOD IN STOOL: 0
FEVER: 0
COUGH: 0
BLOOD IN STOOL: 0
HEADACHES: 0
HALLUCINATIONS: 0
DIARRHEA: 0
FEVER: 0
MYALGIAS: 1
SHORTNESS OF BREATH: 0
NAUSEA: 0
COUGH: 0
BLOOD IN STOOL: 0
HALLUCINATIONS: 0
CHILLS: 0
SHORTNESS OF BREATH: 0
SORE THROAT: 0
NAUSEA: 0
DIZZINESS: 0
MYALGIAS: 0
CHILLS: 0
FOCAL WEAKNESS: 0
STRIDOR: 0
WEAKNESS: 1
COUGH: 0
WEAKNESS: 1
DIARRHEA: 0
ABDOMINAL PAIN: 0
DIARRHEA: 0
VOMITING: 0
FALLS: 1
NAUSEA: 0
HEARTBURN: 0
HEADACHES: 0

## 2020-01-01 ASSESSMENT — COGNITIVE AND FUNCTIONAL STATUS - GENERAL
SUGGESTED CMS G CODE MODIFIER DAILY ACTIVITY: CH
DAILY ACTIVITIY SCORE: 17
HELP NEEDED FOR BATHING: A LOT
SUGGESTED CMS G CODE MODIFIER DAILY ACTIVITY: CK
SUGGESTED CMS G CODE MODIFIER MOBILITY: CH
MOBILITY SCORE: 14
SUGGESTED CMS G CODE MODIFIER MOBILITY: CK
MOVING FROM LYING ON BACK TO SITTING ON SIDE OF FLAT BED: A LITTLE
MOVING TO AND FROM BED TO CHAIR: A LITTLE
TOILETING: A LITTLE
MOBILITY SCORE: 24
STANDING UP FROM CHAIR USING ARMS: A LITTLE
WALKING IN HOSPITAL ROOM: A LITTLE
SUGGESTED CMS G CODE MODIFIER MOBILITY: CL
DAILY ACTIVITIY SCORE: 24
CLIMB 3 TO 5 STEPS WITH RAILING: A LITTLE
DRESSING REGULAR UPPER BODY CLOTHING: A LITTLE
MOBILITY SCORE: 19
STANDING UP FROM CHAIR USING ARMS: A LITTLE
DRESSING REGULAR LOWER BODY CLOTHING: A LOT
MOVING FROM LYING ON BACK TO SITTING ON SIDE OF FLAT BED: UNABLE
CLIMB 3 TO 5 STEPS WITH RAILING: A LOT
TURNING FROM BACK TO SIDE WHILE IN FLAT BAD: A LITTLE
WALKING IN HOSPITAL ROOM: A LITTLE
MOVING TO AND FROM BED TO CHAIR: A LOT
PERSONAL GROOMING: A LITTLE

## 2020-01-01 ASSESSMENT — LIFESTYLE VARIABLES
CONSUMPTION TOTAL: NEGATIVE
TOTAL SCORE: 0
AVERAGE NUMBER OF DAYS PER WEEK YOU HAVE A DRINK CONTAINING ALCOHOL: 0
TOTAL SCORE: 0
HAVE PEOPLE ANNOYED YOU BY CRITICIZING YOUR DRINKING: NO
HOW MANY TIMES IN THE PAST YEAR HAVE YOU HAD 5 OR MORE DRINKS IN A DAY: 0
ALCOHOL_USE: NO
HAVE YOU EVER FELT YOU SHOULD CUT DOWN ON YOUR DRINKING: NO
EVER FELT BAD OR GUILTY ABOUT YOUR DRINKING: NO
TOTAL SCORE: 0
EVER HAD A DRINK FIRST THING IN THE MORNING TO STEADY YOUR NERVES TO GET RID OF A HANGOVER: NO
ON A TYPICAL DAY WHEN YOU DRINK ALCOHOL HOW MANY DRINKS DO YOU HAVE: 0

## 2020-01-01 ASSESSMENT — PATIENT HEALTH QUESTIONNAIRE - PHQ9
SUM OF ALL RESPONSES TO PHQ9 QUESTIONS 1 AND 2: 0
2. FEELING DOWN, DEPRESSED, IRRITABLE, OR HOPELESS: NOT AT ALL
1. LITTLE INTEREST OR PLEASURE IN DOING THINGS: NOT AT ALL

## 2020-01-01 ASSESSMENT — GAIT ASSESSMENTS
ASSISTIVE DEVICE: FRONT WHEEL WALKER
GAIT LEVEL OF ASSIST: MINIMAL ASSIST
DEVIATION: SHUFFLED GAIT;INCREASED BASE OF SUPPORT;BRADYKINETIC
DISTANCE (FEET): 60
GAIT LEVEL OF ASSIST: MINIMAL ASSIST
DEVIATION: SHUFFLED GAIT;INCREASED BASE OF SUPPORT;BRADYKINETIC
DISTANCE (FEET): 100
ASSISTIVE DEVICE: FRONT WHEEL WALKER

## 2020-01-01 ASSESSMENT — PAIN DESCRIPTION - PAIN TYPE
TYPE: ACUTE PAIN

## 2020-01-01 ASSESSMENT — ACTIVITIES OF DAILY LIVING (ADL): TOILETING: INDEPENDENT

## 2020-11-03 PROBLEM — R73.9 HYPERGLYCEMIA: Status: ACTIVE | Noted: 2020-01-01

## 2020-11-03 NOTE — PROGRESS NOTES
"Received report from VINCE Story. Assumed care at 0700. Patient is A/Ox4, c/o mild pain in right buttock. Patient stating \"it feels like someone gave me a shot\". VSS. Plan of care discussed including pain control Patient verbalized understanding. Fall precautions in place. Call light within reach .Hourly rounding in place.   "

## 2020-11-03 NOTE — H&P
Hospital Medicine History & Physical Note    Date of Service  11/3/2020    Primary Care Physician  Alessanrda Cisse M.D.    Consultants  None    Code Status  Full Code    Chief Complaint  Chief Complaint   Patient presents with   • Fall   • Hip Pain     fell twice  hit head w/ LOC first time she fell  2nd time fell hurting her hips          History of Presenting Illness  89 y.o. female who presented 11/2/2020 with fall.  Patient has been following recently per family.  At this time patient has no complaints other than the fall.  Apparently she was complaining of hip pain but currently denies any pain.  Patient is a poor historian.  I did discuss the case including labs and imaging with the ER physician.    Review of Systems  Review of Systems   Constitutional: Negative for chills, fever and malaise/fatigue.   HENT: Negative for congestion.    Respiratory: Negative for cough, sputum production, shortness of breath and stridor.    Cardiovascular: Negative for chest pain, palpitations and leg swelling.   Gastrointestinal: Negative for abdominal pain, constipation, diarrhea, nausea and vomiting.   Genitourinary: Negative for dysuria and urgency.   Musculoskeletal: Positive for falls. Negative for myalgias.   Neurological: Negative for dizziness, tingling, loss of consciousness, weakness and headaches.   Psychiatric/Behavioral: Negative for depression and suicidal ideas.   All other systems reviewed and are negative.      Past Medical History   has a past medical history of Afib (HCC) and CATARACT.    Surgical History   has a past surgical history that includes other abdominal surgery; gyn surgery; and other.     Family History  Reviewed, nonpertinent    Social History   reports that she has quit smoking. She quit after 5.00 years of use. She has quit using smokeless tobacco. She reports that she does not drink alcohol or use drugs.    Allergies  No Known Allergies    Medications  Prior to Admission Medications    Prescriptions Last Dose Informant Patient Reported? Taking?   aspirin EC (ECOTRIN) 81 MG Tablet Delayed Response 11/2/2020 at Unknown time  Yes Yes   Sig: Take 81 mg by mouth every day.      Facility-Administered Medications: None       Physical Exam  Temp:  [37.2 °C (98.9 °F)] 37.2 °C (98.9 °F)  Pulse:  [69-76] 69  Resp:  [16-18] 16  BP: (124-137)/(67-75) 128/75  SpO2:  [91 %-94 %] 94 %    Physical Exam  Vitals signs and nursing note reviewed.   Constitutional:       General: She is not in acute distress.     Appearance: She is well-developed. She is not diaphoretic.   HENT:      Head: Normocephalic and atraumatic.      Right Ear: External ear normal.      Left Ear: External ear normal.      Nose: Nose normal. No congestion or rhinorrhea.      Mouth/Throat:      Mouth: Mucous membranes are moist.      Pharynx: No oropharyngeal exudate.   Eyes:      General:         Right eye: No discharge.         Left eye: No discharge.   Neck:      Musculoskeletal: Normal range of motion and neck supple.      Trachea: No tracheal deviation.   Cardiovascular:      Rate and Rhythm: Normal rate and regular rhythm.      Heart sounds: Murmur present. No friction rub. No gallop.    Pulmonary:      Effort: Pulmonary effort is normal. No respiratory distress.      Breath sounds: Normal breath sounds. No stridor. No wheezing or rales.   Chest:      Chest wall: No tenderness.   Abdominal:      General: Bowel sounds are normal. There is no distension.      Palpations: Abdomen is soft.      Tenderness: There is no abdominal tenderness.   Musculoskeletal: Normal range of motion.         General: No tenderness.      Right lower leg: No edema.      Left lower leg: No edema.   Lymphadenopathy:      Cervical: No cervical adenopathy.   Skin:     General: Skin is warm and dry.      Findings: No erythema or rash.   Neurological:      Mental Status: She is alert and oriented to person, place, and time.      Cranial Nerves: No cranial nerve deficit.    Psychiatric:         Behavior: Behavior normal.         Thought Content: Thought content normal.         Judgment: Judgment normal.         Laboratory:  Recent Labs     11/02/20  2350   WBC 9.8   RBC 4.70   HEMOGLOBIN 14.5   HEMATOCRIT 43.0   MCV 91.5   MCH 30.9   MCHC 33.7   RDW 43.8   PLATELETCT 160*   MPV 11.8     Recent Labs     11/02/20  2350   SODIUM 136   POTASSIUM 4.3   CHLORIDE 101   CO2 22   GLUCOSE 114*   BUN 14   CREATININE 0.80   CALCIUM 9.3     Recent Labs     11/02/20  2350   GLUCOSE 114*         No results for input(s): NTPROBNP in the last 72 hours.      No results for input(s): TROPONINT in the last 72 hours.    Imaging:  CT-PELVIS W/O   Final Result         1.  Subcutaneous hematoma and contusion of the right hip soft tissues.   2.  Severe osteoporosis   3.  Diverticulosis   4.  Atherosclerosis      CT-LSPINE W/O PLUS RECONS   Final Result         1.  Multilevel degenerative changes of the lumbar spine diminish diagnostic sensitivity of this examination.   2.  Grade 1 spondylolisthesis L4 on L5 with associated bilateral facet arthrosis.   3.  Anterior wedge deformity of L2, appears likely chronic with bony remodeling.   4.  Otherwise no acute traumatic bony injury identified.   5.  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   6.  Right nephrolithiasis without visualized obstructive changes.   7.  Diverticulosis   8.  Atherosclerosis      CT-HEAD W/O   Final Result         1.  No acute intracranial abnormality is identified, there are nonspecific white matter changes, commonly associated with small vessel ischemic disease.  Associated mild cerebral atrophy is noted.   2.  Calcified mass abutting the left anterior falx, appearance most compatible with small meningioma, stable since prior study.   3.  Atherosclerosis.            Assessment/Plan:  I anticipate this patient is appropriate for observation status at this time.    Falls- (present on admission)  Assessment &  Plan  -Apparently frequent falls per family recently  -Obtain PT/OT  -Evaluate for safety, may need placement    Hyperglycemia- (present on admission)  Assessment & Plan  -Mild, no need for coverage    Dementia (HCC)- (present on admission)  Assessment & Plan  -Chronic, likely at baseline  -Risk of sundowning, start low-dose Haldol as needed

## 2020-11-03 NOTE — THERAPY
Physical Therapy   Initial Evaluation     Patient Name: Sindy Lyons  Age:  89 y.o., Sex:  female  Medical Record #: [id number]  Today's Date: 11/3/2020     Precautions: (P) Fall Risk    Assessment  Patient is 89 y.o. female presented to the ED with recent falls over the past few days.  Pt is oriented to name and place, is denying that she fell recently but overall is very confused and poor safety and insight. Pt is also presenting with R hip pain, states she had a recent injection there but could not state what it was. Per ED notes pt fell on R hip and has a hematoma. Pt with impaired balance requiring Ady to ambulate with a FWW and therefore recommend further PT at SNF, ultimately pt is unsafe to be living alone given her impaired cognition and poor safety.     Plan    Recommend Physical Therapy 3 times per week until therapy goals are met for the following treatments:  Bed Mobility, Gait Training, Neuro Re-Education / Balance, Stair Training, Therapeutic Activities and Therapeutic Exercises    DC Equipment Recommendations: (P) Unable to determine at this time  Discharge Recommendations: (P) Recommend post-acute placement for additional physical therapy services prior to discharge home        11/03/20 1302   Prior Living Situation   Housing / Facility 1 Story House   Equipment Owned Single Point Cane   Lives with - Patient's Self Care Capacity Alone and Unable to Care For Self   Prior Level of Functional Mobility   Bed Mobility Independent   Transfer Status Independent   Ambulation Independent   Assistive Devices Used Single Point Cane   Comments Pt is a poor historian, all info obtained from pt   Cognition    Comments Pt is confused, knows she is in a hospital but is not oriented to situation, does not recall falling, poor memory and is a poor historian   Gait Analysis   Gait Level Of Assist Minimal Assist   Assistive Device Front Wheel Walker   Distance (Feet) 60   # of Times Distance was Traveled 1   Deviation  Shuffled Gait;Increased Base Of Support;Bradykinetic   Comments Pt ambulated without AD, required HHA/modA for balance   Bed Mobility    Supine to Sit Supervised  (utilized bed rail and HOB elevated)   Functional Mobility   Sit to Stand Minimal Assist   Bed, Chair, Wheelchair Transfer Minimal Assist   Transfer Method Stand Step   Short Term Goals    Short Term Goal # 1 Pt will be able to perform bed mobility and sup <> sit Carlos with HOB flat and no rails in 6 visits.   Short Term Goal # 2 Pt will be able to perform sit <> stand and transfer Carlos in 6 visits so can get OOB safely.   Short Term Goal # 3 Pt will be able to ambulate 150 ft with FWW Carlos in 6 visits so can mobilize in home.

## 2020-11-03 NOTE — PROGRESS NOTES
Please see the H&P Dictated After Midnight for full details    Interval Progress Note:  Patient seen and examined    89-year-old female history of dementia admitted for several recent falls, complaining of right hip pain.  In the emergency department, x-ray of the right hip as well as CT of the head and spine were performed and all of which were negative for acute abnormality or fracture.  C-spine CT did reveal multiple areas of arthritis.    PT and OT worked with the patient today and are recommending skilled nursing facility    Yamel Bejarano D.O.

## 2020-11-03 NOTE — ED TRIAGE NOTES
Pt BIB [facility] s/p falling X 2  First time hit head  No LOC  Tonight fell again hurting bilateral hips  Pt A,A and O X 3  MD at  for assessment

## 2020-11-03 NOTE — DISCHARGE PLANNING
"Hospital Care Management Discharge Planning       Anticipated Discharge Disposition:   · TBD     Action:   · DC planning pending PT/OT recommendations.     Barriers to Discharge:   · PT/OT recomendations     Plan:    · Hospital Care Management Team to continue to provide support services and assistance with discharge planning as needed.       Current Expected Day of Discharge: 11/5       For further assistance please contact the assigned RN Case Manager or  at the extension listed under \"Treatment Teams\".    "

## 2020-11-03 NOTE — PROGRESS NOTES
Med rec updated and complete  Allergies reviewed, per son  Called pts son (Esvin) @ [phone] to verify all prescription medications.  Pts son reports no prescription medications   Pts son reports no antibiotics in the last 2 weeks

## 2020-11-03 NOTE — THERAPY
"Occupational Therapy   Initial Evaluation     Patient Name: Sindy Lyons  Age:  89 y.o., Sex:  female  Medical Record #: [id number]  Today's Date: 11/3/2020     Precautions  Precautions: Fall Risk  Comments: Dementia    Assessment    Patient is 89 y.o. female with a diagnosis of R hip contusion and subcutaneous hematoma, following GLF x2 in the home. Pt lives alone, but has a son who lives nearby. Son is concerned about pt's capacity to care for self at home, as she has Dementia, which was notable during this visit. Pt perseverates on receiving a shot on R hip, as well as her living her  who had passed. Pt, however, is redirectable. She does present now with decreased self care ability, reduced balance, and impaired safety awareness, besides ongoing confusion/memory impairment -- and is currently unsafe to dc home at this state. She will be followed by acute OT while in house, but will also benefit from post acute placement prior to dc home. May also need to consider long term placement elsewhere.     Plan    Recommend Occupational Therapy 3 times per week until therapy goals are met for the following treatments:  Adaptive Equipment, Cognitive Skill Development, Community Re-integration, Manual Therapy Techniques, Self Care/Activities of Daily Living, Therapeutic Activities and Therapeutic Exercises.    DC Equipment Recommendations: Unable to determine at this time  Discharge Recommendations: Recommend post-acute placement for additional occupational therapy services prior to discharge home     Subjective    \"It didn't really start bothering me until they gave me a shot right down there (hip).\"     Objective       11/03/20 1119   Prior Living Situation   Prior Services None   Housing / Facility 1 Story House   Steps Into Home 3   Steps In Home 0   Bathroom Set up Walk In Shower;Shower Chair;Grab Bars   Equipment Owned Tub / Shower Seat;Grab Bar(s) In Tub / Shower;Other (Comments)  (walking stick)   Lives with - " Patient's Self Care Capacity Alone and Unable to Care For Self   Comments pt lives alone, but has a son who lives nearby. per son, pt has Dementia and is now unsafe to be alone at home.   Prior Level of ADL Function   Self Feeding Independent   Grooming / Hygiene Independent   Bathing Independent   Dressing Independent   Toileting Independent   Prior Level of IADL Function   Medication Management Independent   Laundry Independent   Kitchen Mobility Independent   Finances Requires Assist   Home Management Requires Assist   Shopping Requires Assist   Prior Level Of Mobility Independent Without Device in Home;Independent With Device in Community   Driving / Transportation Relatives / Others Provide Transportation   Occupation (Pre-Hospital Vocational) Retired Due To Age   Comments pt has memory issues   Cognition    Cognition / Consciousness X   Level of Consciousness Confused   Ability To Follow Commands 1 Step   New Learning Impaired   Comments pt is confused and unreliable historian   Active ROM Upper Body   Active ROM Upper Body  X   Gross Active ROM Gross Active Range of Motion Impaired, but Appears Adequate for Functional Activities.   Strength Upper Body   Upper Body Strength  X   Gross Strength Generalized Weakness, Equal Bilaterally.    Bed Mobility    Supine to Sit Minimal Assist   Sit to Supine   (UIC post OT)   Scooting Supervised   Rolling Minimum Assist to Lt.   ADL Assessment   Grooming Standing;Supervision   Upper Body Dressing Minimal Assist   Lower Body Dressing Moderate Assist   Functional Mobility   Sit to Stand Minimal Assist   Bed, Chair, Wheelchair Transfer Minimal Assist   Transfer Method Stand Step   Mobility in room with FWW   Short Term Goals   Short Term Goal # 1 pt will complete FB dressing at spv level   Short Term Goal # 2 pt will complete ADL txfs with spv level   Anticipated Discharge Equipment and Recommendations   DC Equipment Recommendations Unable to determine at this time    Discharge Recommendations Recommend post-acute placement for additional occupational therapy services prior to discharge home

## 2020-11-03 NOTE — ASSESSMENT & PLAN NOTE
Frequently falling at home, family does not feel she is safe to live independently  This was discussed with the patient today and she is agreeable to skilled nursing facility followed by assisted living facility  Pending SNF placement

## 2020-11-03 NOTE — PROGRESS NOTES
Pt arrived to unit around 0400.   Pt AOx4. Denies any pain at this time.   Plan of care discussed. Pt was provided with a FWW and educated to call for help. Bed alarm is on and bed is locked in lowest position. Pt says she has a walker and cane at home but that she forgets to use them frequently.   No other needs at this time. Call light within reach.

## 2020-11-03 NOTE — ED PROVIDER NOTES
ED Provider Note    Primary care provider: Alessandra Cisse M.D.  Means of arrival: EMS  History obtained from: patient/family  History limited by: none    CHIEF COMPLAINT  Chief Complaint   Patient presents with   • Fall   • Hip Pain     fell twice  hit head w/ LOC first time she fell  2nd time fell hurting her hips          HPI  Sindy Lyons is a 89 y.o. female who presents to the Emergency Department for evaluation of ground-level fall and hip pain.  Reportedly patient fell in her garage earlier today and states that she slipped and fell hitting her head, although does not believe she lost consciousness.  She is unable to get up off the ground and called EMS using her life alert to help get her up.  They recommended at that time that she be transported to the hospital however patient declined.  She again fell later today and was found by her son laying next to her bed and could not get up on her own.  Patient reports that she again slipped and fell.  Son reports concern that patient is living alone and unable to adequately care for herself.  She seems to have decreased ability to function well on her own.  Family has been in discussions about possible assisted living, however patient has been resistant to this.  From her fall patient reports that she is having right hip pain that is moderate in severity and hurts anytime she moves.  The pain radiates into her right lower back.  Denies any other injuries.  Although she hit her head earlier today denies having a headache.  Denies nausea, vomiting, abdominal pain.    REVIEW OF SYSTEMS  Review of Systems   Constitutional: Negative for fever.   Respiratory: Negative for cough.    Cardiovascular: Negative for chest pain.   Gastrointestinal: Negative for abdominal pain, nausea and vomiting.   Musculoskeletal:        Positive for right hip pain and lower back pain   All other systems reviewed and are negative.        PAST MEDICAL HISTORY   has a past medical history of  "Afib (HCC) and CATARACT.    SURGICAL HISTORY   has a past surgical history that includes other abdominal surgery; gyn surgery; and other.    SOCIAL HISTORY  Social History     Tobacco Use   • Smoking status: Former Smoker     Years: 5.00   • Smokeless tobacco: Former User   • Tobacco comment: 25 years ago   Substance Use Topics   • Alcohol use: No   • Drug use: No      Social History     Substance and Sexual Activity   Drug Use No       FAMILY HISTORY  No family history on file.    CURRENT MEDICATIONS  Home Medications     Reviewed by Danica Pena R.N. (Registered Nurse) on 11/02/20 at 2359  Med List Status: Partial   Medication Last Dose Status   aspirin EC (ECOTRIN) 81 MG Tablet Delayed Response 11/2/2020 Active                ALLERGIES  No Known Allergies    PHYSICAL EXAM  VITAL SIGNS: /75   Pulse 69   Temp 37.2 °C (98.9 °F) (Temporal)   Resp 16   Ht 1.651 m (5' 5\")   Wt 67 kg (147 lb 11.3 oz)   SpO2 94%   BMI 24.58 kg/m²   Vitals reviewed by myself.  Physical Exam   Constitutional:   Patient appears uncomfortable   HENT:   Head: Normocephalic and atraumatic.   Eyes: Pupils are equal, round, and reactive to light. EOM are normal.   Neck: Normal range of motion.   No midline C-spine tenderness   Cardiovascular: Normal rate, regular rhythm and normal heart sounds. Exam reveals no gallop and no friction rub.   No murmur heard.  Pulmonary/Chest: Effort normal and breath sounds normal. No respiratory distress. She has no wheezes. She has no rales.   Abdominal: Soft. She exhibits no distension. There is no abdominal tenderness. There is no rebound.   Musculoskeletal:      Comments: Patient has full range of motion of bilateral hips, however is noted to have slight discomfort with palpation of the right hip and movement of the right hip.  No shortening of the leg is noted.  Patient is able to wiggle her toes and bend her knees bilaterally.  No midline spinal tenderness   Neurological:   Sensation " intact in bilateral lower extremities   Skin: Skin is warm and dry.         DIAGNOSTIC STUDIES /  LABS  Labs Reviewed   CBC WITH DIFFERENTIAL - Abnormal; Notable for the following components:       Result Value    Platelet Count 160 (*)     Neutrophils-Polys 81.30 (*)     Lymphocytes 9.50 (*)     Immature Granulocytes 2.20 (*)     Neutrophils (Absolute) 7.94 (*)     Lymphs (Absolute) 0.93 (*)     Immature Granulocytes (abs) 0.22 (*)     All other components within normal limits   BASIC METABOLIC PANEL - Abnormal; Notable for the following components:    Glucose 114 (*)     All other components within normal limits   ESTIMATED GFR   DIFFERENTIAL COMMENT   URINALYSIS,CULTURE IF INDICATED       All labs reviewed by me.      RADIOLOGY  CT-PELVIS W/O   Final Result         1.  Subcutaneous hematoma and contusion of the right hip soft tissues.   2.  Severe osteoporosis   3.  Diverticulosis   4.  Atherosclerosis      CT-LSPINE W/O PLUS RECONS   Final Result         1.  Multilevel degenerative changes of the lumbar spine diminish diagnostic sensitivity of this examination.   2.  Grade 1 spondylolisthesis L4 on L5 with associated bilateral facet arthrosis.   3.  Anterior wedge deformity of L2, appears likely chronic with bony remodeling.   4.  Otherwise no acute traumatic bony injury identified.   5.  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   6.  Right nephrolithiasis without visualized obstructive changes.   7.  Diverticulosis   8.  Atherosclerosis      CT-HEAD W/O   Final Result         1.  No acute intracranial abnormality is identified, there are nonspecific white matter changes, commonly associated with small vessel ischemic disease.  Associated mild cerebral atrophy is noted.   2.  Calcified mass abutting the left anterior falx, appearance most compatible with small meningioma, stable since prior study.   3.  Atherosclerosis.        The radiologist's interpretation of all radiological studies  have been reviewed by me.        COURSE & MEDICAL DECISION MAKING  Nursing notes, VS, PMSFHx reviewed in chart.    Patient is an 89-year-old female who comes in for evaluation of ground-level fall.  Differential diagnosis includes debility, intracranial hemorrhage, closed head injury, fracture, dislocation.  Diagnostic work-up includes labs and CT of the head, L-spine and pelvis.    Patient's initial vitals are within normal limits.  She appears uncomfortable on exam and is treated with fentanyl after which she feels improved.  Labs returned and are unremarkable.  Imaging returns and demonstrates no acute traumatic injuries.  Patient does have severe osteoporosis.  Patient is also noted to have subcutaneous hematoma and contusion over the right hip soft tissues which is likely the cause of her pain.  Upon reevaluation patient's pain is improved.  I have significant concerns that patient is unsafe for discharge as she has been falling multiple times today and has been unable to get up on her own.  She lives alone.  I discussed hospitalization with patient for evaluation of safety, PT OT evaluation and possible placement to assisted living.  Patient is very hesitant but after discussing with her son she was amenable to hospitalization.  Discussed the case with Dr. Chamberlain who has accepted patient for hospitalization.  Patient is in guarded condition.      FINAL IMPRESSION  1. Weakness    2. Fall from ground level

## 2020-11-04 PROBLEM — S79.911A HIP INJURY, RIGHT, INITIAL ENCOUNTER: Status: ACTIVE | Noted: 2020-01-01

## 2020-11-04 NOTE — PROGRESS NOTES
UA collected and sent to lab. Pt oriented to place and time. Pt ambulated to the bathroom and voided. Full line changed. Pt now resting Patient has no known allergies. Needs met. Hourly rounding in place.

## 2020-11-04 NOTE — DISCHARGE PLANNING
RN ZACH received e-mail from pts son asking SNF referral be sent to [facility]. SNF choice completed and faxed to [facility].

## 2020-11-04 NOTE — HOSPITAL COURSE
History of dementia and debility here with frequent falls, unable to care for herself at home independently.  Pending SNF

## 2020-11-04 NOTE — PROGRESS NOTES
Bedside report received from day RN.Pt is AAO x 3.Pt reports no pain at this time.POC discussed.Evenning assessment done.All needs met at this time.Bed in low position.Call light within reach.Rounding in place.

## 2020-11-04 NOTE — CARE PLAN
Problem: Communication  Goal: The ability to communicate needs accurately and effectively will improve  Outcome: PROGRESSING AS EXPECTED  Note- Educated pt the importance of calling before getting up.     Problem: Safety  Goal: Will remain free from injury  Outcome: PROGRESSING AS EXPECTED  Note-Bed alarm is on.Hourly rounding in place.     Problem: Infection  Goal: Will remain free from infection  Outcome: PROGRESSING AS EXPECTED

## 2020-11-04 NOTE — PROGRESS NOTES
Hospital Medicine Daily Progress Note    Date of Service  11/4/2020    Chief Complaint  89 y.o. female admitted 11/2/2020 with falls    Hospital Course  No notes on file    Interval Problem Update  11/4: Ambulating with walker, pain is controlled.  Patient is agreeable to SNF followed by [facility]    Consultants/Specialty  None    Code Status  Full Code    Disposition  SNF when accepted    Review of Systems  Review of Systems   Constitutional: Positive for malaise/fatigue. Negative for chills and fever.   HENT: Negative for sore throat.    Respiratory: Negative for cough and shortness of breath.    Cardiovascular: Negative for chest pain and palpitations.   Gastrointestinal: Negative for abdominal pain, blood in stool, diarrhea, heartburn, nausea and vomiting.   Genitourinary: Negative for dysuria and frequency.   Musculoskeletal: Positive for falls and myalgias (Controlled). Negative for back pain.   Neurological: Positive for weakness. Negative for dizziness, focal weakness and headaches.   Psychiatric/Behavioral: Negative for depression and hallucinations.   All other systems reviewed and are negative.       Physical Exam  Temp:  [36.7 °C (98 °F)-36.9 °C (98.4 °F)] 36.9 °C (98.4 °F)  Pulse:  [69-83] 69  Resp:  [18] 18  BP: (114-130)/(61-72) 120/72  SpO2:  [91 %-95 %] 93 %    Physical Exam  Constitutional:       General: She is not in acute distress.     Comments: Elderly, sitting up in chair   HENT:      Nose: Nose normal.      Mouth/Throat:      Mouth: Mucous membranes are dry.   Neck:      Musculoskeletal: No muscular tenderness.   Cardiovascular:      Rate and Rhythm: Normal rate and regular rhythm.      Pulses: Normal pulses.      Heart sounds: No murmur.   Pulmonary:      Effort: Pulmonary effort is normal.      Breath sounds: Normal breath sounds.   Abdominal:      General: There is no distension.      Palpations: Abdomen is soft.   Musculoskeletal:         General: No swelling.   Lymphadenopathy:      Cervical:  No cervical adenopathy.   Skin:     General: Skin is warm and dry.      Findings: No rash.   Neurological:      General: No focal deficit present.      Mental Status: She is alert and oriented to person, place, and time.      Motor: Weakness present.      Gait: Gait abnormal (Slowed, shuffling).   Psychiatric:         Mood and Affect: Mood normal.         Thought Content: Thought content normal.         Fluids  No intake or output data in the 24 hours ending 11/04/20 1449    Laboratory  Recent Labs     11/02/20 2350 11/04/20  0316   WBC 9.8 7.9   RBC 4.70 3.82*   HEMOGLOBIN 14.5 11.8*   HEMATOCRIT 43.0 35.4*   MCV 91.5 92.7   MCH 30.9 30.9   MCHC 33.7 33.3*   RDW 43.8 44.6   PLATELETCT 160* 118*   MPV 11.8 12.3     Recent Labs     11/02/20 2350 11/04/20  0316   SODIUM 136 138   POTASSIUM 4.3 3.8   CHLORIDE 101 107   CO2 22 22   GLUCOSE 114* 94   BUN 14 16   CREATININE 0.80 0.75   CALCIUM 9.3 8.4                   Imaging  CT-PELVIS W/O   Final Result         1.  Subcutaneous hematoma and contusion of the right hip soft tissues.   2.  Severe osteoporosis   3.  Diverticulosis   4.  Atherosclerosis      CT-LSPINE W/O PLUS RECONS   Final Result         1.  Multilevel degenerative changes of the lumbar spine diminish diagnostic sensitivity of this examination.   2.  Grade 1 spondylolisthesis L4 on L5 with associated bilateral facet arthrosis.   3.  Anterior wedge deformity of L2, appears likely chronic with bony remodeling.   4.  Otherwise no acute traumatic bony injury identified.   5.  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   6.  Right nephrolithiasis without visualized obstructive changes.   7.  Diverticulosis   8.  Atherosclerosis      CT-HEAD W/O   Final Result         1.  No acute intracranial abnormality is identified, there are nonspecific white matter changes, commonly associated with small vessel ischemic disease.  Associated mild cerebral atrophy is noted.   2.  Calcified mass  abutting the left anterior falx, appearance most compatible with small meningioma, stable since prior study.   3.  Atherosclerosis.           Assessment/Plan  Falls- (present on admission)  Assessment & Plan  Frequently falling at home, family does not feel she is safe to live independently  This was discussed with the patient today and she is agreeable to skilled nursing facility followed by assisted living facility  Pending SNF placement    Chronic atrial fibrillation (HCC)- (present on admission)  Assessment & Plan  No anticoagulation due to high fall risk    Hip injury, right, initial encounter  Assessment & Plan  Due to frequent falls at home.  No evidence of fracture  SNF pending    Hyperglycemia- (present on admission)  Assessment & Plan  -Mild, no need for coverage    Dementia (HCC)- (present on admission)  Assessment & Plan  -Chronic, she is at baseline  -Risk of sundowning, low-dose Haldol as needed         VTE prophylaxis: Lovenox

## 2020-11-04 NOTE — DISCHARGE PLANNING
Received Choice form at 1065  Agency/Facility Name: [facility]   Referral sent per Choice form @ [id number]

## 2020-11-05 NOTE — PROGRESS NOTES
Pt A+OX3, c/o slight pain to R hip, denies intervention, repositioned  Bruise to R hip present  Sitting up in bed tolerating breakfast, declining to sit up in chair at this time despite education  Bed alarm on, safety precautions in place, hourly rounding  Treaded socks on   Incontinent at times, bathroom offered  Pending SNF acceptance

## 2020-11-05 NOTE — CARE PLAN
Problem: Communication  Goal: The ability to communicate needs accurately and effectively will improve  Outcome: PROGRESSING AS EXPECTED  Note: Plan of care discussed, patient verbalizes understanding, reinforcement needed     Problem: Safety  Goal: Will remain free from injury  Outcome: PROGRESSING AS EXPECTED  Note: Treaded socks in place, call light within reach, bed locked in low position, room near nursing station, hourly rounding, bed alarm on      Problem: Venous Thromboembolism (VTW)/Deep Vein Thrombosis (DVT) Prevention:  Goal: Patient will participate in Venous Thrombosis (VTE)/Deep Vein Thrombosis (DVT)Prevention Measures  Outcome: PROGRESSING AS EXPECTED  Note: Scds on, asa in use

## 2020-11-05 NOTE — PROGRESS NOTES
Bedside report received from day RN.Pt is alert and orient.Pt denies pains at the this. Evening assessment completed. Pt resting with no signs of distress or unlabored breathing.  POC discussed.All needs met at this time.Bed in low position.Bed alarm is on. Call light within reach.Rounding in place.

## 2020-11-05 NOTE — DISCHARGE PLANNING
"Hospital Care Management Discharge Planning       Anticipated Discharge Disposition:   · SNF     Action:   · RN CM received phone call from pts daughter in lawKarey ([phone]). Karey stated she will be taking over care of pt and would like to be updated  · RN CM received voice mail from pts daughter in Yam[place]ka ortega stating that her and her  are heading back to [place] and will be handing over care to Karey. Yamilka also stated that they would like a referral sent to [facility].     Barriers to Discharge:   · SNF acceptance     Plan:    · Hospital Care Management Team to continue to provide support services and assistance with discharge planning as needed.       Current Expected Day of Discharge: 11/6       For further assistance please contact the assigned RN Case Manager or  at the extension listed under \"Treatment Teams\".    "

## 2020-11-05 NOTE — DISCHARGE PLANNING
Received Choice form at 0905  Agency/Facility Name: [facility]   Referral sent per Choice form @ 1070

## 2020-11-05 NOTE — PROGRESS NOTES
Pt's son Esvin notified this RN that pt's glasses were missing. After pt's son left, RN looked at admit profile and glasses say that they were sent home with family. Pt now reporting that jenelle ring ,jenelle watch and purse all missing. No belongings other than glasses identified in admit profile. No belongings found in room. CN aware     Received call from pt's son Esvin, he took home pt's watch/purse and the only item unaccounted for are her glasses that despite admit profile, son Esvin who dropped her off says he did not take them home.

## 2020-11-06 NOTE — PROGRESS NOTES
Hospital Medicine Daily Progress Note    Date of Service  11/5/2020    Chief Complaint  89 y.o. female admitted 11/2/2020 with falls    Hospital Course  History of dementia and debility here with frequent falls, unable to care for herself at home independently.  Pending SNF      Interval Problem Update  11/4: Ambulating with walker, pain is controlled.  Patient is agreeable to SNF followed by [facility]  11/5: No physical complaints today.  Vitals stable.  Pleasant    Consultants/Specialty  None    Code Status  Full Code    Disposition  SNF when accepted    Review of Systems  Review of Systems   Constitutional: Positive for malaise/fatigue. Negative for chills and fever.   HENT: Negative for sore throat.    Respiratory: Negative for cough and shortness of breath.    Cardiovascular: Negative for chest pain and palpitations.   Gastrointestinal: Negative for abdominal pain, blood in stool, diarrhea, heartburn, nausea and vomiting.   Genitourinary: Negative for dysuria and frequency.   Musculoskeletal: Positive for falls (Prior to admission). Negative for back pain and myalgias (Controlled).   Neurological: Positive for weakness. Negative for dizziness, focal weakness and headaches.   Psychiatric/Behavioral: Negative for depression and hallucinations.   All other systems reviewed and are negative.       Physical Exam  Temp:  [36.4 °C (97.5 °F)-36.8 °C (98.2 °F)] 36.4 °C (97.5 °F)  Pulse:  [67-81] 67  Resp:  [16-18] 16  BP: (130-139)/(68-77) 139/68  SpO2:  [92 %-96 %] 96 %    Physical Exam  Constitutional:       General: She is not in acute distress.     Comments: Pleasant, cooperative   HENT:      Nose: Nose normal.      Mouth/Throat:      Mouth: Mucous membranes are dry.   Neck:      Musculoskeletal: No muscular tenderness.   Cardiovascular:      Rate and Rhythm: Normal rate and regular rhythm.      Pulses: Normal pulses.      Heart sounds: No murmur.   Pulmonary:      Effort: Pulmonary effort is normal.      Breath sounds:  Normal breath sounds.   Abdominal:      General: There is no distension.      Palpations: Abdomen is soft.   Musculoskeletal:         General: No swelling.   Lymphadenopathy:      Cervical: No cervical adenopathy.   Skin:     General: Skin is warm and dry.      Findings: No rash.   Neurological:      General: No focal deficit present.      Mental Status: She is alert.      Motor: Weakness present.      Gait: Gait abnormal.   Psychiatric:         Behavior: Behavior is not agitated.         Thought Content: Thought content is not delusional.         Cognition and Memory: Cognition is impaired. Memory is impaired.         Fluids  No intake or output data in the 24 hours ending 11/05/20 1910    Laboratory  Recent Labs     11/02/20 2350 11/04/20 0316 11/05/20  0435   WBC 9.8 7.9 7.3   RBC 4.70 3.82* 3.81*   HEMOGLOBIN 14.5 11.8* 11.9*   HEMATOCRIT 43.0 35.4* 36.0*   MCV 91.5 92.7 94.5   MCH 30.9 30.9 31.2   MCHC 33.7 33.3* 33.1*   RDW 43.8 44.6 45.6   PLATELETCT 160* 118* 122*   MPV 11.8 12.3 12.7     Recent Labs     11/02/20 2350 11/04/20  0316   SODIUM 136 138   POTASSIUM 4.3 3.8   CHLORIDE 101 107   CO2 22 22   GLUCOSE 114* 94   BUN 14 16   CREATININE 0.80 0.75   CALCIUM 9.3 8.4                   Imaging  CT-PELVIS W/O   Final Result         1.  Subcutaneous hematoma and contusion of the right hip soft tissues.   2.  Severe osteoporosis   3.  Diverticulosis   4.  Atherosclerosis      CT-LSPINE W/O PLUS RECONS   Final Result         1.  Multilevel degenerative changes of the lumbar spine diminish diagnostic sensitivity of this examination.   2.  Grade 1 spondylolisthesis L4 on L5 with associated bilateral facet arthrosis.   3.  Anterior wedge deformity of L2, appears likely chronic with bony remodeling.   4.  Otherwise no acute traumatic bony injury identified.   5.  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   6.  Right nephrolithiasis without visualized obstructive changes.   7.   Diverticulosis   8.  Atherosclerosis      CT-HEAD W/O   Final Result         1.  No acute intracranial abnormality is identified, there are nonspecific white matter changes, commonly associated with small vessel ischemic disease.  Associated mild cerebral atrophy is noted.   2.  Calcified mass abutting the left anterior falx, appearance most compatible with small meningioma, stable since prior study.   3.  Atherosclerosis.           Assessment/Plan  * Falls- (present on admission)  Assessment & Plan  Frequently falling at home, family does not feel she is safe to live independently  This was discussed with the patient today and she is agreeable to skilled nursing facility followed by assisted living facility  Pending SNF placement    Chronic atrial fibrillation (HCC)- (present on admission)  Assessment & Plan  No anticoagulation due to high fall risk    Hip injury, right, initial encounter  Assessment & Plan  Due to frequent falls at home.  No evidence of fracture  SNF pending    Hyperglycemia- (present on admission)  Assessment & Plan  -Mild, no need for coverage    Dementia (HCC)- (present on admission)  Assessment & Plan  -Chronic, she is at baseline  -Risk of sundowning, low-dose Haldol as needed       VTE prophylaxis: Lovenox

## 2020-11-06 NOTE — PROGRESS NOTES
Assumed care of patient at 1900.  Patient is alert and appropriate at this time.  Bed alarm remains on.  Patient denies needs and appears to be in no distress.

## 2020-11-06 NOTE — PROGRESS NOTES
Patient set bed alarm off and is confused.  Patient reoriented to time and place but remains angry that an alarm went off on her bed.  I apologized to patient for scaring her with the bed alarm and explained the reason for it.  Patient asking for a phone book and threatened to call the police.  Patient back in bed now and remains confused but cooperative.  Patient appears to be in no distress.  Will monitor closely/

## 2020-11-06 NOTE — DISCHARGE PLANNING
"Hospital Care Management Discharge Planning       Anticipated Discharge Disposition:   · SNF     Action:   · RN CM called [facility] and spoke to Carolyn. Carolyn stated they are still pending insurance auth. RN ZACH to follow.     Barriers to Discharge:   · SNF acceptance     Plan:    · Hospital Care Management Team to continue to provide support services and assistance with discharge planning as needed.       Current Expected Day of Discharge: 11/7       For further assistance please contact the assigned RN Case Manager or  at the extension listed under \"Treatment Teams\".    "

## 2020-11-06 NOTE — PROGRESS NOTES
Hospital Medicine Daily Progress Note    Date of Service  11/6/2020    Chief Complaint  89 y.o. female admitted 11/2/2020 with falls    Hospital Course  History of dementia and debility here with frequent falls, unable to care for herself at home independently.  Pending SNF      Interval Problem Update  11/4: Ambulating with walker, pain is controlled.  Patient is agreeable to SNF followed by [facility]  11/5: No physical complaints today.  Vitals stable.  Pleasant  11/6: Physical complaints, gets up with assistance to the restroom.  Vitals stable.    Consultants/Specialty  None    Code Status  Full Code    Disposition  SNF when accepted    Review of Systems  Review of Systems   Constitutional: Positive for malaise/fatigue. Negative for chills and fever.   HENT: Negative for sore throat.    Respiratory: Negative for cough and shortness of breath.    Cardiovascular: Negative for chest pain and palpitations.   Gastrointestinal: Negative for abdominal pain, blood in stool, diarrhea, heartburn, nausea and vomiting.   Genitourinary: Negative for dysuria and frequency.   Musculoskeletal: Negative for back pain, falls and myalgias.   Neurological: Positive for weakness. Negative for dizziness, focal weakness and headaches.   Psychiatric/Behavioral: Positive for memory loss. Negative for depression and hallucinations.   All other systems reviewed and are negative.       Physical Exam  Temp:  [36.4 °C (97.5 °F)-37.1 °C (98.8 °F)] 37.1 °C (98.8 °F)  Pulse:  [67-76] 76  Resp:  [16-20] 20  BP: (138-139)/(68-99) 138/99  SpO2:  [90 %-96 %] 90 %    Physical Exam  Constitutional:       General: She is not in acute distress.     Comments: Pleasant, elderly, sitting at edge of bed   HENT:      Nose: Nose normal.      Mouth/Throat:      Mouth: Mucous membranes are dry.   Neck:      Musculoskeletal: No muscular tenderness.   Cardiovascular:      Rate and Rhythm: Normal rate and regular rhythm.      Pulses: Normal pulses.      Heart sounds: No  murmur.   Pulmonary:      Effort: Pulmonary effort is normal.      Breath sounds: Normal breath sounds.   Abdominal:      General: There is no distension.      Palpations: Abdomen is soft.   Musculoskeletal:         General: No swelling.   Lymphadenopathy:      Cervical: No cervical adenopathy.   Skin:     General: Skin is warm and dry.      Findings: Bruising (Scattered) present. No rash.   Neurological:      General: No focal deficit present.      Mental Status: She is alert.      Motor: Weakness present.      Gait: Gait abnormal.   Psychiatric:         Behavior: Behavior is not agitated.         Thought Content: Thought content is not delusional.         Cognition and Memory: Cognition is impaired. Memory is impaired.         Fluids    Intake/Output Summary (Last 24 hours) at 11/6/2020 1329  Last data filed at 11/5/2020 2210  Gross per 24 hour   Intake 240 ml   Output --   Net 240 ml       Laboratory  Recent Labs     11/04/20  0316 11/05/20  0435   WBC 7.9 7.3   RBC 3.82* 3.81*   HEMOGLOBIN 11.8* 11.9*   HEMATOCRIT 35.4* 36.0*   MCV 92.7 94.5   MCH 30.9 31.2   MCHC 33.3* 33.1*   RDW 44.6 45.6   PLATELETCT 118* 122*   MPV 12.3 12.7     Recent Labs     11/04/20  0316   SODIUM 138   POTASSIUM 3.8   CHLORIDE 107   CO2 22   GLUCOSE 94   BUN 16   CREATININE 0.75   CALCIUM 8.4                   Imaging  CT-PELVIS W/O   Final Result         1.  Subcutaneous hematoma and contusion of the right hip soft tissues.   2.  Severe osteoporosis   3.  Diverticulosis   4.  Atherosclerosis      CT-LSPINE W/O PLUS RECONS   Final Result         1.  Multilevel degenerative changes of the lumbar spine diminish diagnostic sensitivity of this examination.   2.  Grade 1 spondylolisthesis L4 on L5 with associated bilateral facet arthrosis.   3.  Anterior wedge deformity of L2, appears likely chronic with bony remodeling.   4.  Otherwise no acute traumatic bony injury identified.   5.  Bilateral neural foraminal stenosis throughout the  lumbar spine.   6.  Right nephrolithiasis without visualized obstructive changes.   7.  Diverticulosis   8.  Atherosclerosis      CT-HEAD W/O   Final Result         1.  No acute intracranial abnormality is identified, there are nonspecific white matter changes, commonly associated with small vessel ischemic disease.  Associated mild cerebral atrophy is noted.   2.  Calcified mass abutting the left anterior falx, appearance most compatible with small meningioma, stable since prior study.   3.  Atherosclerosis.           Assessment/Plan  * Falls- (present on admission)  Assessment & Plan  Frequently falling at home, family does not feel she is safe to live independently  This was discussed with the patient today and she is agreeable to skilled nursing facility followed by assisted living facility  Pending SNF placement    Chronic atrial fibrillation (HCC)- (present on admission)  Assessment & Plan  No anticoagulation due to high fall risk    Hip injury, right, initial encounter  Assessment & Plan  Due to frequent falls at home.  No evidence of fracture  SNF pending    Hyperglycemia- (present on admission)  Assessment & Plan  -Mild, no need for coverage    Dementia (HCC)- (present on admission)  Assessment & Plan  -Chronic, she is at baseline  -Risk of sundowning, low-dose Haldol as needed       VTE prophylaxis: Lovenox

## 2020-11-07 NOTE — PROGRESS NOTES
Patient d/c to [facility] in stable condition, report given, no iv, no questions by patient

## 2020-11-07 NOTE — CARE PLAN
Problem: Safety  Goal: Will remain free from injury  Outcome: PROGRESSING AS EXPECTED  Intervention: Provide assistance with mobility  Flowsheets (Taken 11/7/2020 0229)  Assistance: Assistance of One  Goal: Will remain free from falls  Outcome: PROGRESSING AS EXPECTED  Intervention: Assess risk factors for falls  Flowsheets  Taken 11/7/2020 0229 by Alondra Delaney R.N.  Pt Calls for Assistance:   Yes   No  History of fall: 2  Mobility Status Assessment: 1-1 Healthcare Provider Required for Assistance with Ambulation & Transfer  Taken 11/3/2020 1302 by Acacia Arana PT  Date of Last Fall: 11/02/20  Intervention: Implement fall precautions  Flowsheets  Taken 11/7/2020 0229  Bed Alarm: Yes - Alarm On  Taken 11/6/2020 2249  Environmental Precautions:   Treaded Slipper Socks on Patient   Personal Belongings, Wastebasket, Call Bell etc. in Easy Reach   Report Given to Other Health Care Providers Regarding Fall Risk   Bed in Low Position   Communication Sign for Patients & Families   Mobility Assessed & Appropriate Sign Placed     Problem: Pain Management  Goal: Pain level will decrease to patient's comfort goal  Outcome: PROGRESSING AS EXPECTED     Problem: Communication  Goal: The ability to communicate needs accurately and effectively will improve  Outcome: PROGRESSING SLOWER THAN EXPECTED

## 2020-11-07 NOTE — CARE PLAN
Problem: Communication  Goal: The ability to communicate needs accurately and effectively will improve  Outcome: PROGRESSING SLOWER THAN EXPECTED  Intervention: Reorient patient to environment as needed  Flowsheets (Taken 11/6/2020 1631)  Oriented to::   Unit Routine   Call Light & Bedside Controls   Visiting Policy     Problem: Safety  Goal: Will remain free from falls  Intervention: Implement fall precautions  Flowsheets (Taken 11/5/2020 5397 by Pina Jang RMIGUELITO)  Environmental Precautions:   Treaded Slipper Socks on Patient   Personal Belongings, Wastebasket, Call Bell etc. in Easy Reach   Bed in Low Position     Problem: Mobility  Goal: Risk for activity intolerance will decrease  Outcome: PROGRESSING AS EXPECTED

## 2020-11-07 NOTE — DISCHARGE PLANNING
Spoke To: Carolyn (liaison)  Agency/Facility Name: [facility]  Plan or Request: Bed available today.

## 2020-11-07 NOTE — DISCHARGE PLANNING
Anticipated Discharge Disposition: SNF    Action: Notified by [facility] liaison that they have obtained insurance auth and have a bed available today.     Transport form completed and faxed to [facility]    Barriers to Discharge: None    Plan: Transfer to SNF    VM left for pt's son regarding transport time. SW previously spoke with son the AM and notified him that pt would likely transfer today.     COBRA packet completed and placed in pt's chart

## 2020-11-07 NOTE — THERAPY
Physical Therapy   Daily Treatment     Patient Name: Sindy Lyons  Age:  89 y.o., Sex:  female  Medical Record #: [id number]  Today's Date: 11/6/2020     Precautions: (P) Fall Risk    Assessment    Pt is progressing as expected with functional mobility and able to demonstrate with improved strength, balance, and activity tolerance. Pt was able to improve ambulation distance and participate in going up/down stairs with B railing use. Pt was primarily limited by fatigue and required frequent seated rest breaks. Pt was able to tolerate standing therapeutic exercises and sit<>stands to improve BLE strength. Pt required frequent cues and redirection during session due to confusion. With current functional mobility and cognition pt would benefit from a short stay at SNF, however, will require half-way or group home setting for safe d/c given current confusion and poor safety.     Plan    Continue current treatment plan.    DC Equipment Recommendations: (P) Unable to determine at this time  Discharge Recommendations: (P) Recommend post-acute placement for additional physical therapy services prior to discharge home(may benefit from half-way vs group home )    Objective       11/06/20 1630   Sitting Lower Body Exercises   Sitting Lower Body Exercises Yes   Sit to Stand Other Resistance (See Comments)  (5 reps with arms crossed and FWW in front )   Standing Lower Body Exercises   Standing Lower Body Exercises Yes   Marching 1 set of 10   Gait Analysis   Gait Level Of Assist Minimal Assist   Assistive Device Front Wheel Walker   Distance (Feet) 100   # of Times Distance was Traveled 1   Deviation Shuffled Gait;Increased Base Of Support;Bradykinetic   # of Stairs Climbed 5   Level of Assist with Stairs Minimal Assist   Weight Bearing Status fwb   Skilled Intervention Verbal Cuing;Facilitation   Comments frequent cues for heel strike and increasing foot clearence, poor carry over and following    Bed Mobility    Supine to Sit Supervised    Scooting Supervised   Skilled Intervention Verbal Cuing   Comments HOB elevated and rails up; relies on rails to roll to side and come upright    Functional Mobility   Sit to Stand Supervised   Bed, Chair, Wheelchair Transfer Minimal Assist   Skilled Intervention Verbal Cuing   Short Term Goals    Short Term Goal # 1 Pt will be able to perform bed mobility and sup <> sit Carlos with HOB flat and no rails in 6 visits.   Goal Outcome # 1 Progressing as expected   Short Term Goal # 2 Pt will be able to perform sit <> stand and transfer Carlos in 6 visits so can get OOB safely.   Goal Outcome # 2 Progressing as expected   Short Term Goal # 3 Pt will be able to ambulate 150 ft with FWW Carlos in 6 visits so can mobilize in home.   Goal Outcome # 3 Progressing as expected

## 2020-11-07 NOTE — PROGRESS NOTES
Pt c/o generalized pain once this shift and requested tylenol once. Pain resolved per pt.  Pt A&Ox1 - to self this shift.  Pt ambulating to restroom with x1 assist with FWW.

## 2020-11-07 NOTE — DISCHARGE PLANNING
Received Transport Form at 1120  Spoke to Michael at [facility]  Transport is scheduled for today at 1400 going to [facility]    Carolyn (liaison) aware of transport time.

## 2020-11-07 NOTE — DISCHARGE SUMMARY
Discharge Summary    CHIEF COMPLAINT ON ADMISSION  Chief Complaint   Patient presents with   • Fall   • Hip Pain     fell twice  hit head w/ LOC first time she fell  2nd time fell hurting her hips          Reason for Admission  EMS     Admission Date  11/2/2020    CODE STATUS  Full Code    HPI & HOSPITAL COURSE  This is a 89 y.o. female with a history of dementia and debility here with frequent falls, unable to care for herself at home independently.  She was evaluated with x-ray and was found to have no evidence of acute fracture.  She had a bruise on her right hip however pain in this area was minimal and she was able to ambulate using a walker as well as assistance.  PT and OT recommended skilled nursing facility.  Her needs are too high for home independently therefore this was discussed with the patient and she was agreeable.  Discharge to skilled nursing facility was arranged for her and she was discharged there in stable condition.  She will continue ongoing physical therapy      Therefore, she is discharged in good and stable condition to skilled nursing facility.    The patient met 2-midnight criteria for an inpatient stay at the time of discharge.    Discharge Date  10/7/2020    FOLLOW UP ITEMS POST DISCHARGE  Follow-up with PCP as needed    DISCHARGE DIAGNOSES  Principal Problem:    Falls POA: Yes  Active Problems:    Chronic atrial fibrillation (HCC) (Chronic) POA: Yes    Dementia (HCC) POA: Yes    Hyperglycemia POA: Yes    Hip injury, right, initial encounter POA: Unknown  Resolved Problems:    * No resolved hospital problems. *      FOLLOW UP  No future appointments.  No follow-up provider specified.    MEDICATIONS ON DISCHARGE     Medication List      START taking these medications      Instructions   acetaminophen 325 MG Tabs  Commonly known as: Tylenol   Take 2 Tabs by mouth every 6 hours as needed (Mild Pain; (Pain scale 1-3); Temp greater than 100.5 F).  Dose: 650 mg        CONTINUE taking these  medications      Instructions   aspirin 325 MG Tabs  Commonly known as: ASA   Take 325 mg by mouth every bedtime.  Dose: 325 mg     CALCIUM PO   Take 1 Cap by mouth every bedtime.  Dose: 1 Cap            Allergies  No Known Allergies    DIET  Orders Placed This Encounter   Procedures   • Diet Order Diet: Regular     Standing Status:   Standing     Number of Occurrences:   1     Order Specific Question:   Diet:     Answer:   Regular [1]       ACTIVITY  As tolerated.  Weight bearing as tolerated    CONSULTATIONS  None    PROCEDURES  None    LABORATORY  Lab Results   Component Value Date    SODIUM 138 11/04/2020    POTASSIUM 3.8 11/04/2020    CHLORIDE 107 11/04/2020    CO2 22 11/04/2020    GLUCOSE 94 11/04/2020    BUN 16 11/04/2020    CREATININE 0.75 11/04/2020        Lab Results   Component Value Date    WBC 7.3 11/05/2020    HEMOGLOBIN 11.9 (L) 11/05/2020    HEMATOCRIT 36.0 (L) 11/05/2020    PLATELETCT 122 (L) 11/05/2020        Total time of the discharge process exceeds 40 minutes.

## 2021-01-01 ENCOUNTER — HOME CARE VISIT (OUTPATIENT)
Dept: HOSPICE | Facility: HOSPICE | Age: 86
End: 2021-01-01
Payer: MEDICARE

## 2021-01-01 ENCOUNTER — APPOINTMENT (OUTPATIENT)
Dept: RADIOLOGY | Facility: MEDICAL CENTER | Age: 86
End: 2021-01-01
Attending: EMERGENCY MEDICINE
Payer: COMMERCIAL

## 2021-01-01 ENCOUNTER — HOSPITAL ENCOUNTER (OUTPATIENT)
Facility: MEDICAL CENTER | Age: 86
End: 2021-01-09
Attending: EMERGENCY MEDICINE | Admitting: INTERNAL MEDICINE
Payer: COMMERCIAL

## 2021-01-01 ENCOUNTER — HOSPITAL ENCOUNTER (EMERGENCY)
Facility: MEDICAL CENTER | Age: 86
End: 2021-07-30
Attending: EMERGENCY MEDICINE
Payer: COMMERCIAL

## 2021-01-01 ENCOUNTER — HOSPICE ADMISSION (OUTPATIENT)
Dept: HOSPICE | Facility: HOSPICE | Age: 86
End: 2021-01-01
Payer: MEDICARE

## 2021-01-01 ENCOUNTER — HOSPITAL ENCOUNTER (OUTPATIENT)
Facility: MEDICAL CENTER | Age: 86
End: 2021-02-06
Attending: NURSE PRACTITIONER
Payer: COMMERCIAL

## 2021-01-01 ENCOUNTER — HOSPITAL ENCOUNTER (EMERGENCY)
Facility: MEDICAL CENTER | Age: 86
End: 2021-05-25
Attending: EMERGENCY MEDICINE
Payer: COMMERCIAL

## 2021-01-01 ENCOUNTER — HOSPITAL ENCOUNTER (EMERGENCY)
Facility: MEDICAL CENTER | Age: 86
End: 2021-06-22
Attending: EMERGENCY MEDICINE
Payer: COMMERCIAL

## 2021-01-01 VITALS — SYSTOLIC BLOOD PRESSURE: 108 MMHG | HEART RATE: 60 BPM | DIASTOLIC BLOOD PRESSURE: 64 MMHG | RESPIRATION RATE: 16 BRPM

## 2021-01-01 VITALS
OXYGEN SATURATION: 95 % | TEMPERATURE: 97.4 F | DIASTOLIC BLOOD PRESSURE: 63 MMHG | RESPIRATION RATE: 33 BRPM | HEART RATE: 66 BPM | BODY MASS INDEX: 24.09 KG/M2 | HEIGHT: 66 IN | SYSTOLIC BLOOD PRESSURE: 133 MMHG | WEIGHT: 149.91 LBS

## 2021-01-01 VITALS
SYSTOLIC BLOOD PRESSURE: 153 MMHG | OXYGEN SATURATION: 97 % | HEIGHT: 66 IN | HEART RATE: 70 BPM | DIASTOLIC BLOOD PRESSURE: 71 MMHG | WEIGHT: 150 LBS | RESPIRATION RATE: 18 BRPM | TEMPERATURE: 97.1 F | BODY MASS INDEX: 24.11 KG/M2

## 2021-01-01 VITALS
HEART RATE: 104 BPM | SYSTOLIC BLOOD PRESSURE: 108 MMHG | TEMPERATURE: 97.5 F | DIASTOLIC BLOOD PRESSURE: 60 MMHG | RESPIRATION RATE: 14 BRPM

## 2021-01-01 VITALS — DIASTOLIC BLOOD PRESSURE: 70 MMHG | SYSTOLIC BLOOD PRESSURE: 102 MMHG | RESPIRATION RATE: 18 BRPM | HEART RATE: 106 BPM

## 2021-01-01 VITALS
DIASTOLIC BLOOD PRESSURE: 75 MMHG | HEIGHT: 66 IN | SYSTOLIC BLOOD PRESSURE: 146 MMHG | WEIGHT: 150 LBS | BODY MASS INDEX: 24.11 KG/M2 | OXYGEN SATURATION: 94 % | HEART RATE: 76 BPM | RESPIRATION RATE: 16 BRPM | TEMPERATURE: 98 F

## 2021-01-01 VITALS
WEIGHT: 150 LBS | RESPIRATION RATE: 28 BRPM | OXYGEN SATURATION: 95 % | BODY MASS INDEX: 24.11 KG/M2 | SYSTOLIC BLOOD PRESSURE: 177 MMHG | TEMPERATURE: 98 F | DIASTOLIC BLOOD PRESSURE: 76 MMHG | HEART RATE: 78 BPM | HEIGHT: 66 IN

## 2021-01-01 VITALS — DIASTOLIC BLOOD PRESSURE: 58 MMHG | HEART RATE: 78 BPM | SYSTOLIC BLOOD PRESSURE: 108 MMHG | RESPIRATION RATE: 20 BRPM

## 2021-01-01 VITALS — HEART RATE: 100 BPM | DIASTOLIC BLOOD PRESSURE: 62 MMHG | RESPIRATION RATE: 28 BRPM | SYSTOLIC BLOOD PRESSURE: 100 MMHG

## 2021-01-01 VITALS — DIASTOLIC BLOOD PRESSURE: 58 MMHG | HEART RATE: 68 BPM | RESPIRATION RATE: 18 BRPM | SYSTOLIC BLOOD PRESSURE: 90 MMHG

## 2021-01-01 VITALS — HEART RATE: 84 BPM | SYSTOLIC BLOOD PRESSURE: 102 MMHG | RESPIRATION RATE: 20 BRPM | DIASTOLIC BLOOD PRESSURE: 64 MMHG

## 2021-01-01 VITALS — SYSTOLIC BLOOD PRESSURE: 118 MMHG | RESPIRATION RATE: 16 BRPM | HEART RATE: 68 BPM | DIASTOLIC BLOOD PRESSURE: 64 MMHG

## 2021-01-01 VITALS — SYSTOLIC BLOOD PRESSURE: 98 MMHG | HEART RATE: 60 BPM | DIASTOLIC BLOOD PRESSURE: 58 MMHG | RESPIRATION RATE: 18 BRPM

## 2021-01-01 VITALS — SYSTOLIC BLOOD PRESSURE: 112 MMHG | RESPIRATION RATE: 16 BRPM | HEART RATE: 62 BPM | DIASTOLIC BLOOD PRESSURE: 68 MMHG

## 2021-01-01 DIAGNOSIS — I48.20 CHRONIC ATRIAL FIBRILLATION (HCC): Chronic | ICD-10-CM

## 2021-01-01 DIAGNOSIS — K92.1 HEMATOCHEZIA: ICD-10-CM

## 2021-01-01 DIAGNOSIS — F01.50 VASCULAR DEMENTIA WITHOUT BEHAVIORAL DISTURBANCE (HCC): ICD-10-CM

## 2021-01-01 DIAGNOSIS — F03.90 DEMENTIA WITHOUT BEHAVIORAL DISTURBANCE, UNSPECIFIED DEMENTIA TYPE: ICD-10-CM

## 2021-01-01 DIAGNOSIS — F03.91 DEMENTIA WITH BEHAVIORAL DISTURBANCE, UNSPECIFIED DEMENTIA TYPE: ICD-10-CM

## 2021-01-01 DIAGNOSIS — R45.851 SUICIDAL IDEATION: ICD-10-CM

## 2021-01-01 DIAGNOSIS — Z51.5 PALLIATIVE CARE PATIENT: Primary | ICD-10-CM

## 2021-01-01 DIAGNOSIS — R29.898 LEFT ARM WEAKNESS: ICD-10-CM

## 2021-01-01 DIAGNOSIS — Z23 NEED FOR VACCINATION: ICD-10-CM

## 2021-01-01 LAB
ALBUMIN SERPL BCP-MCNC: 3.5 G/DL (ref 3.2–4.9)
ALBUMIN SERPL BCP-MCNC: 3.6 G/DL (ref 3.2–4.9)
ALBUMIN SERPL BCP-MCNC: 3.8 G/DL (ref 3.2–4.9)
ALBUMIN SERPL BCP-MCNC: 3.9 G/DL (ref 3.2–4.9)
ALBUMIN/GLOB SERPL: 0.9 G/DL
ALBUMIN/GLOB SERPL: 1.1 G/DL
ALP SERPL-CCNC: 118 U/L (ref 30–99)
ALP SERPL-CCNC: 119 U/L (ref 30–99)
ALP SERPL-CCNC: 304 U/L (ref 30–99)
ALP SERPL-CCNC: 96 U/L (ref 30–99)
ALT SERPL-CCNC: 16 U/L (ref 2–50)
ALT SERPL-CCNC: 23 U/L (ref 2–50)
ALT SERPL-CCNC: 28 U/L (ref 2–50)
ALT SERPL-CCNC: 7 U/L (ref 2–50)
AMPHET UR QL SCN: NEGATIVE
ANION GAP SERPL CALC-SCNC: 11 MMOL/L (ref 7–16)
ANION GAP SERPL CALC-SCNC: 11 MMOL/L (ref 7–16)
ANION GAP SERPL CALC-SCNC: 13 MMOL/L (ref 7–16)
ANION GAP SERPL CALC-SCNC: 14 MMOL/L (ref 7–16)
APPEARANCE UR: CLEAR
AST SERPL-CCNC: 17 U/L (ref 12–45)
AST SERPL-CCNC: 26 U/L (ref 12–45)
AST SERPL-CCNC: 37 U/L (ref 12–45)
AST SERPL-CCNC: 42 U/L (ref 12–45)
BACTERIA #/AREA URNS HPF: NEGATIVE /HPF
BACTERIA UR CULT: NORMAL
BARBITURATES UR QL SCN: NEGATIVE
BASOPHILS # BLD AUTO: 0.6 % (ref 0–1.8)
BASOPHILS # BLD AUTO: 0.8 % (ref 0–1.8)
BASOPHILS # BLD AUTO: 0.9 % (ref 0–1.8)
BASOPHILS # BLD AUTO: 1.1 % (ref 0–1.8)
BASOPHILS # BLD: 0.04 K/UL (ref 0–0.12)
BASOPHILS # BLD: 0.04 K/UL (ref 0–0.12)
BASOPHILS # BLD: 0.05 K/UL (ref 0–0.12)
BASOPHILS # BLD: 0.08 K/UL (ref 0–0.12)
BENZODIAZ UR QL SCN: POSITIVE
BILIRUB SERPL-MCNC: 0.4 MG/DL (ref 0.1–1.5)
BILIRUB SERPL-MCNC: 0.5 MG/DL (ref 0.1–1.5)
BILIRUB SERPL-MCNC: 0.9 MG/DL (ref 0.1–1.5)
BILIRUB SERPL-MCNC: 0.9 MG/DL (ref 0.1–1.5)
BILIRUB UR QL STRIP.AUTO: NEGATIVE
BUN SERPL-MCNC: 12 MG/DL (ref 8–22)
BUN SERPL-MCNC: 14 MG/DL (ref 8–22)
BUN SERPL-MCNC: 15 MG/DL (ref 8–22)
BUN SERPL-MCNC: 17 MG/DL (ref 8–22)
BZE UR QL SCN: NEGATIVE
CALCIUM SERPL-MCNC: 9 MG/DL (ref 8.5–10.5)
CALCIUM SERPL-MCNC: 9.2 MG/DL (ref 8.5–10.5)
CALCIUM SERPL-MCNC: 9.3 MG/DL (ref 8.5–10.5)
CALCIUM SERPL-MCNC: 9.3 MG/DL (ref 8.5–10.5)
CANNABINOIDS UR QL SCN: NEGATIVE
CHLORIDE SERPL-SCNC: 101 MMOL/L (ref 96–112)
CHLORIDE SERPL-SCNC: 104 MMOL/L (ref 96–112)
CHLORIDE SERPL-SCNC: 105 MMOL/L (ref 96–112)
CHLORIDE SERPL-SCNC: 106 MMOL/L (ref 96–112)
CO2 SERPL-SCNC: 22 MMOL/L (ref 20–33)
CO2 SERPL-SCNC: 23 MMOL/L (ref 20–33)
CO2 SERPL-SCNC: 24 MMOL/L (ref 20–33)
CO2 SERPL-SCNC: 26 MMOL/L (ref 20–33)
COLOR UR: YELLOW
COVID ORDER STATUS COVID19: NORMAL
CREAT SERPL-MCNC: 0.66 MG/DL (ref 0.5–1.4)
CREAT SERPL-MCNC: 0.71 MG/DL (ref 0.5–1.4)
CREAT SERPL-MCNC: 0.75 MG/DL (ref 0.5–1.4)
CREAT SERPL-MCNC: 0.84 MG/DL (ref 0.5–1.4)
EKG IMPRESSION: NORMAL
EOSINOPHIL # BLD AUTO: 0.03 K/UL (ref 0–0.51)
EOSINOPHIL # BLD AUTO: 0.1 K/UL (ref 0–0.51)
EOSINOPHIL # BLD AUTO: 0.26 K/UL (ref 0–0.51)
EOSINOPHIL # BLD AUTO: 0.26 K/UL (ref 0–0.51)
EOSINOPHIL NFR BLD: 0.6 % (ref 0–6.9)
EOSINOPHIL NFR BLD: 1.5 % (ref 0–6.9)
EOSINOPHIL NFR BLD: 3.5 % (ref 0–6.9)
EOSINOPHIL NFR BLD: 4.7 % (ref 0–6.9)
EPI CELLS #/AREA URNS HPF: ABNORMAL /HPF
ERYTHROCYTE [DISTWIDTH] IN BLOOD BY AUTOMATED COUNT: 46 FL (ref 35.9–50)
ERYTHROCYTE [DISTWIDTH] IN BLOOD BY AUTOMATED COUNT: 47.9 FL (ref 35.9–50)
ERYTHROCYTE [DISTWIDTH] IN BLOOD BY AUTOMATED COUNT: 48.6 FL (ref 35.9–50)
ERYTHROCYTE [DISTWIDTH] IN BLOOD BY AUTOMATED COUNT: 51.8 FL (ref 35.9–50)
EST. AVERAGE GLUCOSE BLD GHB EST-MCNC: 103 MG/DL
ETHANOL BLD-MCNC: <10.1 MG/DL (ref 0–10)
FLUAV RNA SPEC QL NAA+PROBE: NEGATIVE
FLUBV RNA SPEC QL NAA+PROBE: NEGATIVE
GLOBULIN SER CALC-MCNC: 3.2 G/DL (ref 1.9–3.5)
GLOBULIN SER CALC-MCNC: 3.5 G/DL (ref 1.9–3.5)
GLOBULIN SER CALC-MCNC: 3.6 G/DL (ref 1.9–3.5)
GLOBULIN SER CALC-MCNC: 3.9 G/DL (ref 1.9–3.5)
GLUCOSE SERPL-MCNC: 103 MG/DL (ref 65–99)
GLUCOSE SERPL-MCNC: 72 MG/DL (ref 65–99)
GLUCOSE SERPL-MCNC: 91 MG/DL (ref 65–99)
GLUCOSE SERPL-MCNC: 93 MG/DL (ref 65–99)
GLUCOSE UR STRIP.AUTO-MCNC: NEGATIVE MG/DL
HBA1C MFR BLD: 5.2 % (ref 0–5.6)
HCT VFR BLD AUTO: 43.8 % (ref 37–47)
HCT VFR BLD AUTO: 46.6 % (ref 37–47)
HCT VFR BLD AUTO: 47.3 % (ref 37–47)
HCT VFR BLD AUTO: 48.2 % (ref 37–47)
HGB BLD-MCNC: 14.1 G/DL (ref 12–16)
HGB BLD-MCNC: 15.3 G/DL (ref 12–16)
HGB BLD-MCNC: 15.6 G/DL (ref 12–16)
HGB BLD-MCNC: 15.8 G/DL (ref 12–16)
HYALINE CASTS #/AREA URNS LPF: ABNORMAL /LPF
IMM GRANULOCYTES # BLD AUTO: 0.01 K/UL (ref 0–0.11)
IMM GRANULOCYTES # BLD AUTO: 0.02 K/UL (ref 0–0.11)
IMM GRANULOCYTES # BLD AUTO: 0.02 K/UL (ref 0–0.11)
IMM GRANULOCYTES # BLD AUTO: 0.03 K/UL (ref 0–0.11)
IMM GRANULOCYTES NFR BLD AUTO: 0.2 % (ref 0–0.9)
IMM GRANULOCYTES NFR BLD AUTO: 0.3 % (ref 0–0.9)
IMM GRANULOCYTES NFR BLD AUTO: 0.4 % (ref 0–0.9)
IMM GRANULOCYTES NFR BLD AUTO: 0.4 % (ref 0–0.9)
KETONES UR STRIP.AUTO-MCNC: NEGATIVE MG/DL
LEUKOCYTE ESTERASE UR QL STRIP.AUTO: ABNORMAL
LYMPHOCYTES # BLD AUTO: 1.4 K/UL (ref 1–4.8)
LYMPHOCYTES # BLD AUTO: 1.49 K/UL (ref 1–4.8)
LYMPHOCYTES # BLD AUTO: 1.8 K/UL (ref 1–4.8)
LYMPHOCYTES # BLD AUTO: 2.33 K/UL (ref 1–4.8)
LYMPHOCYTES NFR BLD: 20.7 % (ref 22–41)
LYMPHOCYTES NFR BLD: 30.8 % (ref 22–41)
LYMPHOCYTES NFR BLD: 31 % (ref 22–41)
LYMPHOCYTES NFR BLD: 32.2 % (ref 22–41)
MCH RBC QN AUTO: 30.8 PG (ref 27–33)
MCH RBC QN AUTO: 31.5 PG (ref 27–33)
MCH RBC QN AUTO: 31.6 PG (ref 27–33)
MCH RBC QN AUTO: 31.9 PG (ref 27–33)
MCHC RBC AUTO-ENTMCNC: 32.2 G/DL (ref 33.6–35)
MCHC RBC AUTO-ENTMCNC: 32.8 G/DL (ref 33.6–35)
MCHC RBC AUTO-ENTMCNC: 32.8 G/DL (ref 33.6–35)
MCHC RBC AUTO-ENTMCNC: 33 G/DL (ref 33.6–35)
MCV RBC AUTO: 94 FL (ref 81.4–97.8)
MCV RBC AUTO: 95.7 FL (ref 81.4–97.8)
MCV RBC AUTO: 96 FL (ref 81.4–97.8)
MCV RBC AUTO: 99.1 FL (ref 81.4–97.8)
METHADONE UR QL SCN: NEGATIVE
MICRO URNS: ABNORMAL
MONOCYTES # BLD AUTO: 0.51 K/UL (ref 0–0.85)
MONOCYTES # BLD AUTO: 0.6 K/UL (ref 0–0.85)
MONOCYTES # BLD AUTO: 0.7 K/UL (ref 0–0.85)
MONOCYTES # BLD AUTO: 0.72 K/UL (ref 0–0.85)
MONOCYTES NFR BLD AUTO: 10.6 % (ref 0–13.4)
MONOCYTES NFR BLD AUTO: 10.6 % (ref 0–13.4)
MONOCYTES NFR BLD AUTO: 10.7 % (ref 0–13.4)
MONOCYTES NFR BLD AUTO: 9.3 % (ref 0–13.4)
NEUTROPHILS # BLD AUTO: 2.74 K/UL (ref 2–7.15)
NEUTROPHILS # BLD AUTO: 2.87 K/UL (ref 2–7.15)
NEUTROPHILS # BLD AUTO: 4.13 K/UL (ref 2–7.15)
NEUTROPHILS # BLD AUTO: 4.48 K/UL (ref 2–7.15)
NEUTROPHILS NFR BLD: 51.3 % (ref 44–72)
NEUTROPHILS NFR BLD: 54.8 % (ref 44–72)
NEUTROPHILS NFR BLD: 56.8 % (ref 44–72)
NEUTROPHILS NFR BLD: 66.2 % (ref 44–72)
NITRITE UR QL STRIP.AUTO: NEGATIVE
NRBC # BLD AUTO: 0 K/UL
NRBC BLD-RTO: 0 /100 WBC
OPIATES UR QL SCN: NEGATIVE
OXYCODONE UR QL SCN: NEGATIVE
PCP UR QL SCN: NEGATIVE
PH UR STRIP.AUTO: 5 [PH] (ref 5–8)
PLATELET # BLD AUTO: 170 K/UL (ref 164–446)
PLATELET # BLD AUTO: 206 K/UL (ref 164–446)
PLATELET # BLD AUTO: 207 K/UL (ref 164–446)
PLATELET # BLD AUTO: 226 K/UL (ref 164–446)
PMV BLD AUTO: 11.1 FL (ref 9–12.9)
PMV BLD AUTO: 11.6 FL (ref 9–12.9)
PMV BLD AUTO: 11.9 FL (ref 9–12.9)
PMV BLD AUTO: 11.9 FL (ref 9–12.9)
POTASSIUM SERPL-SCNC: 3.9 MMOL/L (ref 3.6–5.5)
POTASSIUM SERPL-SCNC: 4.1 MMOL/L (ref 3.6–5.5)
POTASSIUM SERPL-SCNC: 4.3 MMOL/L (ref 3.6–5.5)
POTASSIUM SERPL-SCNC: 4.4 MMOL/L (ref 3.6–5.5)
PROPOXYPH UR QL SCN: NEGATIVE
PROT SERPL-MCNC: 6.8 G/DL (ref 6–8.2)
PROT SERPL-MCNC: 7.3 G/DL (ref 6–8.2)
PROT SERPL-MCNC: 7.4 G/DL (ref 6–8.2)
PROT SERPL-MCNC: 7.5 G/DL (ref 6–8.2)
PROT UR QL STRIP: NEGATIVE MG/DL
RBC # BLD AUTO: 4.42 M/UL (ref 4.2–5.4)
RBC # BLD AUTO: 4.94 M/UL (ref 4.2–5.4)
RBC # BLD AUTO: 4.96 M/UL (ref 4.2–5.4)
RBC # BLD AUTO: 5.02 M/UL (ref 4.2–5.4)
RBC UR QL AUTO: NEGATIVE
SARS-COV-2 RNA RESP QL NAA+PROBE: NOTDETECTED
SIGNIFICANT IND 70042: NORMAL
SITE SITE: NORMAL
SODIUM SERPL-SCNC: 138 MMOL/L (ref 135–145)
SODIUM SERPL-SCNC: 140 MMOL/L (ref 135–145)
SODIUM SERPL-SCNC: 141 MMOL/L (ref 135–145)
SODIUM SERPL-SCNC: 141 MMOL/L (ref 135–145)
SOURCE SOURCE: NORMAL
SP GR UR STRIP.AUTO: 1.02
SPECIMEN SOURCE: NORMAL
TROPONIN T SERPL-MCNC: 161 NG/L (ref 6–19)
UROBILINOGEN UR STRIP.AUTO-MCNC: 1 MG/DL
VALPROATE SERPL-MCNC: 27.2 UG/ML (ref 50–100)
WBC # BLD AUTO: 4.8 K/UL (ref 4.8–10.8)
WBC # BLD AUTO: 5.6 K/UL (ref 4.8–10.8)
WBC # BLD AUTO: 6.8 K/UL (ref 4.8–10.8)
WBC # BLD AUTO: 7.5 K/UL (ref 4.8–10.8)
WBC #/AREA URNS HPF: ABNORMAL /HPF

## 2021-01-01 PROCEDURE — 84484 ASSAY OF TROPONIN QUANT: CPT

## 2021-01-01 PROCEDURE — 90791 PSYCH DIAGNOSTIC EVALUATION: CPT

## 2021-01-01 PROCEDURE — 6650990 HC HOSPICE AND HOME CARE RX REV CODE 0250

## 2021-01-01 PROCEDURE — 81001 URINALYSIS AUTO W/SCOPE: CPT

## 2021-01-01 PROCEDURE — G0299 HHS/HOSPICE OF RN EA 15 MIN: HCPCS

## 2021-01-01 PROCEDURE — 85025 COMPLETE CBC W/AUTO DIFF WBC: CPT

## 2021-01-01 PROCEDURE — 80307 DRUG TEST PRSMV CHEM ANLYZR: CPT

## 2021-01-01 PROCEDURE — S9126 HOSPICE CARE, IN THE HOME, P: HCPCS

## 2021-01-01 PROCEDURE — 80053 COMPREHEN METABOLIC PANEL: CPT

## 2021-01-01 PROCEDURE — U0005 INFEC AGEN DETEC AMPLI PROBE: HCPCS

## 2021-01-01 PROCEDURE — 87502 INFLUENZA DNA AMP PROBE: CPT

## 2021-01-01 PROCEDURE — 99284 EMERGENCY DEPT VISIT MOD MDM: CPT

## 2021-01-01 PROCEDURE — 36415 COLL VENOUS BLD VENIPUNCTURE: CPT

## 2021-01-01 PROCEDURE — G0156 HHCP-SVS OF AIDE,EA 15 MIN: HCPCS

## 2021-01-01 PROCEDURE — 87086 URINE CULTURE/COLONY COUNT: CPT

## 2021-01-01 PROCEDURE — 80164 ASSAY DIPROPYLACETIC ACD TOT: CPT

## 2021-01-01 PROCEDURE — A9270 NON-COVERED ITEM OR SERVICE: HCPCS | Performed by: EMERGENCY MEDICINE

## 2021-01-01 PROCEDURE — G1004 CDSM NDSC: HCPCS

## 2021-01-01 PROCEDURE — G0378 HOSPITAL OBSERVATION PER HR: HCPCS

## 2021-01-01 PROCEDURE — 665036 HSPC NOTICE OF ELECTION NOE

## 2021-01-01 PROCEDURE — 71045 X-RAY EXAM CHEST 1 VIEW: CPT

## 2021-01-01 PROCEDURE — 99285 EMERGENCY DEPT VISIT HI MDM: CPT

## 2021-01-01 PROCEDURE — 99220 PR INITIAL OBSERVATION CARE,LEVL III: CPT | Performed by: INTERNAL MEDICINE

## 2021-01-01 PROCEDURE — U0003 INFECTIOUS AGENT DETECTION BY NUCLEIC ACID (DNA OR RNA); SEVERE ACUTE RESPIRATORY SYNDROME CORONAVIRUS 2 (SARS-COV-2) (CORONAVIRUS DISEASE [COVID-19]), AMPLIFIED PROBE TECHNIQUE, MAKING USE OF HIGH THROUGHPUT TECHNOLOGIES AS DESCRIBED BY CMS-2020-01-R: HCPCS

## 2021-01-01 PROCEDURE — 70450 CT HEAD/BRAIN W/O DYE: CPT

## 2021-01-01 PROCEDURE — 99284 EMERGENCY DEPT VISIT MOD MDM: CPT | Mod: 25

## 2021-01-01 PROCEDURE — G0155 HHCP-SVS OF CSW,EA 15 MIN: HCPCS

## 2021-01-01 PROCEDURE — 700102 HCHG RX REV CODE 250 W/ 637 OVERRIDE(OP): Performed by: EMERGENCY MEDICINE

## 2021-01-01 PROCEDURE — 93005 ELECTROCARDIOGRAM TRACING: CPT | Performed by: EMERGENCY MEDICINE

## 2021-01-01 PROCEDURE — 82077 ASSAY SPEC XCP UR&BREATH IA: CPT

## 2021-01-01 PROCEDURE — 83036 HEMOGLOBIN GLYCOSYLATED A1C: CPT

## 2021-01-01 RX ORDER — ATORVASTATIN CALCIUM 20 MG/1
20 TABLET, FILM COATED ORAL EVERY EVENING
Qty: 30 TAB | Refills: 1 | Status: SHIPPED | OUTPATIENT
Start: 2021-01-01 | End: 2021-01-01

## 2021-01-01 RX ORDER — ACETAMINOPHEN 325 MG/1
650 TABLET ORAL EVERY 6 HOURS PRN
Status: DISCONTINUED | OUTPATIENT
Start: 2021-01-01 | End: 2021-01-01 | Stop reason: HOSPADM

## 2021-01-01 RX ORDER — SODIUM CHLORIDE 9 MG/ML
1000 INJECTION, SOLUTION INTRAVENOUS ONCE
Status: DISCONTINUED | OUTPATIENT
Start: 2021-01-01 | End: 2021-01-01 | Stop reason: HOSPADM

## 2021-01-01 RX ORDER — ASPIRIN 300 MG/1
300 SUPPOSITORY RECTAL DAILY
Status: DISCONTINUED | OUTPATIENT
Start: 2021-01-01 | End: 2021-01-01 | Stop reason: HOSPADM

## 2021-01-01 RX ORDER — ATORVASTATIN CALCIUM 20 MG/1
20 TABLET, FILM COATED ORAL EVERY EVENING
Status: DISCONTINUED | OUTPATIENT
Start: 2021-01-01 | End: 2021-01-01 | Stop reason: HOSPADM

## 2021-01-01 RX ORDER — ASPIRIN 81 MG/1
324 TABLET, CHEWABLE ORAL ONCE
Status: COMPLETED | OUTPATIENT
Start: 2021-01-01 | End: 2021-01-01

## 2021-01-01 RX ORDER — AMOXICILLIN 250 MG
2 CAPSULE ORAL 2 TIMES DAILY
Status: DISCONTINUED | OUTPATIENT
Start: 2021-01-01 | End: 2021-01-01 | Stop reason: HOSPADM

## 2021-01-01 RX ORDER — MORPHINE SULFATE 100 MG/5ML
5 SOLUTION ORAL EVERY 4 HOURS
Qty: 30 ML | Refills: 0 | Status: SHIPPED | OUTPATIENT
Start: 2021-01-01 | End: 2022-08-18

## 2021-01-01 RX ORDER — ASPIRIN 325 MG
325 TABLET ORAL DAILY
Status: DISCONTINUED | OUTPATIENT
Start: 2021-01-01 | End: 2021-01-01 | Stop reason: HOSPADM

## 2021-01-01 RX ORDER — ASPIRIN 81 MG/1
324 TABLET, CHEWABLE ORAL DAILY
Status: DISCONTINUED | OUTPATIENT
Start: 2021-01-01 | End: 2021-01-01 | Stop reason: HOSPADM

## 2021-01-01 RX ORDER — ASPIRIN 81 MG/1
81 TABLET, CHEWABLE ORAL DAILY
Qty: 100 TAB | Refills: 4 | Status: SHIPPED | OUTPATIENT
Start: 2021-01-01 | End: 2021-01-01

## 2021-01-01 RX ORDER — POLYETHYLENE GLYCOL 3350 17 G/17G
1 POWDER, FOR SOLUTION ORAL DAILY
Status: DISCONTINUED | OUTPATIENT
Start: 2021-01-01 | End: 2021-01-01 | Stop reason: HOSPADM

## 2021-01-01 RX ORDER — ASPIRIN 325 MG
325 TABLET ORAL
Status: DISCONTINUED | OUTPATIENT
Start: 2021-01-01 | End: 2021-01-01

## 2021-01-01 RX ORDER — BISACODYL 10 MG
10 SUPPOSITORY, RECTAL RECTAL
Status: DISCONTINUED | OUTPATIENT
Start: 2021-01-01 | End: 2021-01-01 | Stop reason: HOSPADM

## 2021-01-01 RX ADMIN — ASPIRIN 324 MG: 81 TABLET, CHEWABLE ORAL at 08:36

## 2021-01-01 SDOH — ECONOMIC STABILITY: GENERAL

## 2021-01-01 ASSESSMENT — ENCOUNTER SYMPTOMS
SOMNOLENCE: 1
SPEECH CHANGE: 0
FORGETFULNESS: 1
DENIES PAIN: 1
CONSTIPATION: 1
DESCRIPTION OF MEMORY LOSS: SHORT TERM
DESCRIPTION OF MEMORY LOSS: LONG TERM
FATIGUE: 1
DESCRIPTION OF MEMORY LOSS: LONG TERM
FORGETFULNESS: 1
SOMNOLENCE: 1
DENIES PAIN: 1
FATIGUE: 1
PAIN SEVERITY GOAL: 0/10
LAST BOWEL MOVEMENT: 65958
DESCRIPTION OF MEMORY LOSS: IMMEDIATE
DESCRIPTION OF MEMORY LOSS: SHORT TERM
FORGETFULNESS: 1
PERSON REPORTING PAIN: DIRECT OBSERVATION
SLEEP QUALITY: ADEQUATE
STOOL FREQUENCY: LESS THAN DAILY
BOWEL PATTERN NORMAL: 1
FATIGUES EASILY: 1
MUSCULOSKELETAL NEGATIVE: 1
DENIES PAIN: 1
DESCRIPTION OF MEMORY LOSS: IMMEDIATE
TREMORS: 0
SUBJECTIVE PAIN PROGRESSION: UNCHANGED
FATIGUES EASILY: 1
LAST BOWEL MOVEMENT: 65966
DESCRIPTION OF MEMORY LOSS: LONG TERM
SLEEP QUALITY: ADEQUATE
FORGETFULNESS: 1
CARDIOVASCULAR NEGATIVE: 1
SOMNOLENCE: 1
PERSON REPORTING PAIN: PATIENT
HYPOTENSION: 1
FORGETFULNESS: 1
DESCRIPTION OF MEMORY LOSS: SHORT TERM
DESCRIPTION OF MEMORY LOSS: SHORT TERM
DESCRIPTION OF MEMORY LOSS: IMMEDIATE
DENIES PAIN: 1
LAST BOWEL MOVEMENT: 65970
LAST BOWEL MOVEMENT: 65966
SLEEP QUALITY: ADEQUATE
DESCRIPTION OF MEMORY LOSS: SHORT TERM
PERSON REPORTING PAIN: PATIENT
DENIES PAIN: 1
SOMNOLENCE: 1
DENIES PAIN: 1
SLEEP QUALITY: ADEQUATE
LAST BOWEL MOVEMENT: 65970
SOMNOLENCE: 1
FORGETFULNESS: 1
DENIES PAIN: 1
DESCRIPTION OF MEMORY LOSS: IMMEDIATE
GASTROINTESTINAL NEGATIVE: 1
STOOL FREQUENCY: LESS THAN DAILY
CONSTITUTIONAL NEGATIVE: 1
DIARRHEA: 1
STOOL FREQUENCY: LESS THAN DAILY
FATIGUE: 1
SUBJECTIVE PAIN PROGRESSION: UNCHANGED
DESCRIPTION OF MEMORY LOSS: SHORT TERM
STOOL FREQUENCY: LESS THAN DAILY
LAST BOWEL MOVEMENT: 65966
DESCRIPTION OF MEMORY LOSS: SHORT TERM
LOWEST PAIN SEVERITY IN PAST 24 HOURS: 0/10
STOOL FREQUENCY: LESS THAN DAILY
SENSORY CHANGE: 0
FATIGUES EASILY: 1
DESCRIPTION OF MEMORY LOSS: SHORT TERM
SLEEP QUALITY: ADEQUATE
SHORTNESS OF BREATH: 1
PERSON REPORTING PAIN: PATIENT
TINGLING: 1
FATIGUES EASILY: 1
FORGETFULNESS: 1
LAST BOWEL MOVEMENT: 65970
LAST BOWEL MOVEMENT: 65964
SLEEP QUALITY: ADEQUATE
FATIGUES EASILY: 1
DENIES PAIN: 1
DENIES PAIN: 1
FATIGUES EASILY: 1
STOOL FREQUENCY: LESS THAN DAILY
STOOL FREQUENCY: LESS THAN DAILY
SLEEP QUALITY: ADEQUATE
HEADACHES: 0
PERSON REPORTING PAIN: PATIENT
DENIES PAIN: 1
BOWEL PATTERN NORMAL: 1
FATIGUES EASILY: 1
STOOL FREQUENCY: LESS THAN DAILY
DIZZINESS: 0
DESCRIPTION OF MEMORY LOSS: IMMEDIATE
FATIGUES EASILY: 1
PERSON REPORTING PAIN: PATIENT
DESCRIPTION OF MEMORY LOSS: IMMEDIATE
DESCRIPTION OF MEMORY LOSS: IMMEDIATE
BOWEL PATTERN NORMAL: 1
STOOL FREQUENCY: LESS THAN DAILY
LOWEST PAIN SEVERITY IN PAST 24 HOURS: 0/10
PERSON REPORTING PAIN: PATIENT
SLEEP QUALITY: ADEQUATE
HIGHEST PAIN SEVERITY IN PAST 24 HOURS: 0/10
DENIES PAIN: 1
SLEEP QUALITY: ADEQUATE
FOCAL WEAKNESS: 1
DESCRIPTION OF MEMORY LOSS: LONG TERM
PERSON REPORTING PAIN: PATIENT
PERSON REPORTING PAIN: PATIENT
RESPIRATORY NEGATIVE: 1
DESCRIPTION OF MEMORY LOSS: LONG TERM
DESCRIPTION OF MEMORY LOSS: IMMEDIATE
FORGETFULNESS: 1
PSYCHIATRIC NEGATIVE: 1
STOOL FREQUENCY: LESS THAN DAILY
SOMNOLENCE: 1
FATIGUE: 1
SLEEP QUALITY: ADEQUATE

## 2021-01-01 ASSESSMENT — FIBROSIS 4 INDEX
FIB4 SCORE: 2.78
FIB4 SCORE: 2.59
FIB4 SCORE: 3.01

## 2021-01-01 ASSESSMENT — PAIN SCALES - PAIN ASSESSMENT IN ADVANCED DEMENTIA (PAINAD)
TOTALSCORE: 0
NEGVOCALIZATION: 0 - NONE.
CONSOLABILITY: 0 - NO NEED TO CONSOLE.
NEGVOCALIZATION: 0 - NONE.
TOTALSCORE: 0
BODYLANGUAGE: 0 - RELAXED.
NEGVOCALIZATION: 0 - NONE.
FACIALEXPRESSION: 0 - SMILING OR INEXPRESSIVE.
BODYLANGUAGE: 0 - RELAXED.
BODYLANGUAGE: 0 - RELAXED.
FACIALEXPRESSION: 0 - SMILING OR INEXPRESSIVE.
FACIALEXPRESSION: 0 - SMILING OR INEXPRESSIVE.
CONSOLABILITY: 0 - NO NEED TO CONSOLE.
CONSOLABILITY: 0 - NO NEED TO CONSOLE.
TOTALSCORE: 0

## 2021-01-01 ASSESSMENT — ACTIVITIES OF DAILY LIVING (ADL)
EATING_REQUIRES_ASSISTANCE: 1
PHYSICAL_TRANSFER_REQUIRES_ASSISTANCE: 1
CONTINENCE_REQUIRES_ASSISTANCE: 1
CONTINENCE_REQUIRES_ASSISTANCE: 1
BATHING_REQUIRES_ASSISTANCE: 1
BATHING_REQUIRES_ASSISTANCE: 1
DRESSING_REQUIRES_ASSISTANCE: 1
MONEY MANAGEMENT (EXPENSES/BILLS): TOTALLY DEPENDENT
PHYSICAL_TRANSFER_REQUIRES_ASSISTANCE: 1
DRESSING_REQUIRES_ASSISTANCE: 1
AMBULATION_REQUIRES_ASSISTANCE: 1

## 2021-01-01 ASSESSMENT — PATIENT HEALTH QUESTIONNAIRE - PHQ9
SUM OF ALL RESPONSES TO PHQ QUESTIONS 1-9: 12
5. POOR APPETITE OR OVEREATING: 3 - NEARLY EVERY DAY
CLINICAL INTERPRETATION OF PHQ2 SCORE: 3

## 2021-01-01 ASSESSMENT — SOCIAL DETERMINANTS OF HEALTH (SDOH): ACTIVE STRESSOR - LOSS OF CONTROL: 1

## 2021-01-09 PROBLEM — G45.9 TIA (TRANSIENT ISCHEMIC ATTACK): Status: ACTIVE | Noted: 2021-01-01

## 2021-01-09 PROBLEM — R01.1 HEART MURMUR: Status: ACTIVE | Noted: 2021-01-01

## 2021-01-09 NOTE — DISCHARGE SUMMARY
Discharge Summary  AGAINST MEDICAL ADVICE  CHIEF COMPLAINT ON ADMISSION  Chief Complaint   Patient presents with   • Arm Pain     Left arm pain/feeling frozen at 0400 and subsiding around 0600       Reason for Admission  Left hand numbness    Admission Date  1/9/2021    CODE STATUS  Full Code    HPI & HOSPITAL COURSE    89-year-old female with history of atrial fibrillation and dementia presented 1/9 with left hand weakness, the patient lives at the assisted living and she had mild to moderate dementia, the history was taken from the patient who states she woke up with weakness on her left hand, also she felt cold, she did not mention significant speech problem, no chest pain or shortness of breath no headache or no other weakness, her symptoms improved in couple hours, patient was brought to the hospital for stroke, the patient has atrial fibrillation and she is not on beta-blockers or anticoagulation only aspirin, CT scan on admission did not show any acute finding or bleeding, since her symptoms improved and unknown duration of symptoms the patient has not qualified for TPA.  The patient is alert and oriented x2-3 she is very forgetful and she is not able to make her own decision.  The patient was agitated and refused to be admitted, called her son who came at bedside, discussed the case with the patient and her son, discussed the risk of recurrent stroke, and the importance of doing images and labs with monitoring during the hospitalization however the patient started to be agitated, the son understands and he wanted to take his mother home, the patient will be discharged AGAINST MEDICAL ADVICE.  The patient will be provided with aspirin and atorvastatin, encouraged the patient to follow-up primary care doctor.   I had long discussion with the patient and her son about the importance of anticoagulation to prevent stroke, discussed the risk of bleeding, the son agreed to continue aspirin and discussed  anticoagulation with the primary care doctor.  Also the patient son agreed not to be aggressive with treatment and images due to image and advance age.      Discharge Date  01/09/21    FOLLOW UP ITEMS POST DISCHARGE  Follow-up with primary care doctor    DISCHARGE DIAGNOSES  Principal Problem:    TIA (transient ischemic attack) POA: Unknown  Active Problems:    Chronic atrial fibrillation (HCC) (Chronic) POA: Yes    Heart murmur POA: Unknown    Dementia (HCC) POA: Yes  Resolved Problems:    * No resolved hospital problems. *      FOLLOW UP.  No follow-up provider specified.    MEDICATIONS ON DISCHARGE     Medication List      START taking these medications      Instructions   aspirin 81 MG Chew chewable tablet  Commonly known as: ASA   Chew 1 Tab every day.  Dose: 81 mg     atorvastatin 20 MG Tabs  Commonly known as: LIPITOR   Take 1 Tab by mouth every evening.  Dose: 20 mg        ASK your doctor about these medications      Instructions   acetaminophen 325 MG Tabs  Commonly known as: Tylenol   Take 2 Tabs by mouth every 6 hours as needed (Mild Pain; (Pain scale 1-3); Temp greater than 100.5 F).  Dose: 650 mg            Allergies  No Known Allergies    DIET  Orders Placed This Encounter   Procedures   • Diet NPO     Standing Status:   Standing     Number of Occurrences:   1     Order Specific Question:   Restrict to:     Answer:   Strict [1]         CONSULTATIONS  None     PROCEDURES  None     LABORATORY  Lab Results   Component Value Date    SODIUM 141 01/09/2021    POTASSIUM 3.9 01/09/2021    CHLORIDE 104 01/09/2021    CO2 23 01/09/2021    GLUCOSE 91 01/09/2021    BUN 14 01/09/2021    CREATININE 0.71 01/09/2021        Lab Results   Component Value Date    WBC 5.6 01/09/2021    HEMOGLOBIN 14.1 01/09/2021    HEMATOCRIT 43.8 01/09/2021    PLATELETCT 206 01/09/2021        Total time of the discharge process exceeds 33 minutes.

## 2021-01-09 NOTE — ED NOTES
Pt ambulated with 1 staff assist to restroom and back. Pt readjusted to bed. On monitor. Waiting for admission bed. No needs at this time. Call light in reach.

## 2021-01-09 NOTE — H&P
Hospital Medicine History & Physical Note    Date of Service  1/9/2021    Primary Care Physician  Alessandra Cisse M.D.    Consultants  None     Code Status  Full Code    Chief Complaint  Chief Complaint   Patient presents with   • Arm Pain     Left arm pain/feeling frozen at 0400 and subsiding around 0600       History of Presenting Illness    89-year-old female with history of atrial fibrillation and dementia presented 1/9 with left hand weakness, the patient lives at the assisted living and she had mild to moderate dementia, the history was taken from the patient who states she woke up with weakness on her left hand, also she felt cold, she did not mention significant speech problem, no chest pain or shortness of breath no headache or no other weakness, her symptoms improved in couple hours, patient was brought to the hospital for stroke, the patient has atrial fibrillation and she is not on beta-blockers or anticoagulation only aspirin, CT scan on admission did not show any acute finding or bleeding, since her symptoms improved and unknown duration of symptoms the patient has not qualified for TPA.      Review of Systems  Review of Systems   Constitutional: Negative.    HENT: Negative.    Respiratory: Negative.    Cardiovascular: Negative.    Gastrointestinal: Negative.    Genitourinary: Negative.    Musculoskeletal: Negative.    Skin: Negative.    Neurological: Positive for tingling and focal weakness. Negative for dizziness, tremors, sensory change, speech change and headaches.   Psychiatric/Behavioral: Negative.        Past Medical History   has a past medical history of Afib (HCC), CATARACT, and Heart murmur (1/9/2021).    Surgical History   has a past surgical history that includes other abdominal surgery; gyn surgery; and other.     Family History  Reviewed unremarkable.    Social History   reports that she has quit smoking. She quit after 5.00 years of use. She has quit using smokeless tobacco. She  reports that she does not drink alcohol or use drugs.    Allergies  No Known Allergies    Medications  Prior to Admission Medications   Prescriptions Last Dose Informant Patient Reported? Taking?   CALCIUM PO  Family Member Yes No   Sig: Take 1 Cap by mouth every bedtime.   acetaminophen (TYLENOL) 325 MG Tab   No No   Sig: Take 2 Tabs by mouth every 6 hours as needed (Mild Pain; (Pain scale 1-3); Temp greater than 100.5 F).   aspirin (ASA) 325 MG Tab  Family Member Yes No   Sig: Take 325 mg by mouth every bedtime.      Facility-Administered Medications: None       Physical Exam  Temp:  [36.7 °C (98 °F)-36.8 °C (98.2 °F)] 36.7 °C (98 °F)  Pulse:  [65-86] 84  Resp:  [16-25] 20  BP: (134-177)/() 177/76  SpO2:  [95 %-97 %] 97 %    Physical Exam  Constitutional:       General: She is not in acute distress.     Appearance: She is not ill-appearing.   HENT:      Head: Normocephalic and atraumatic.   Eyes:      General: No scleral icterus.  Cardiovascular:      Rate and Rhythm: Normal rate.      Heart sounds: Murmur present.      Comments: Systolic murmur heard on aortic area  Pulmonary:      Effort: Pulmonary effort is normal. No respiratory distress.      Breath sounds: No wheezing.   Abdominal:      General: Abdomen is flat. Bowel sounds are normal. There is no distension.      Palpations: Abdomen is soft.      Tenderness: There is no abdominal tenderness.   Musculoskeletal:         General: No deformity.      Right lower leg: No edema.      Left lower leg: No edema.   Skin:     Coloration: Skin is not jaundiced.      Findings: No bruising, lesion or rash.   Neurological:      General: No focal deficit present.      Mental Status: She is alert and oriented to person, place, and time. Mental status is at baseline.      Cranial Nerves: No cranial nerve deficit.      Sensory: No sensory deficit.      Motor: No weakness.      Comments: The patient is alert and oriented x3 however significant confusing          Laboratory:  Recent Labs     01/09/21  0702   WBC 5.6   RBC 4.42   HEMOGLOBIN 14.1   HEMATOCRIT 43.8   MCV 99.1*   MCH 31.9   MCHC 32.2*   RDW 51.8*   PLATELETCT 206   MPV 11.1     Recent Labs     01/09/21  0702   SODIUM 141   POTASSIUM 3.9   CHLORIDE 104   CO2 23   GLUCOSE 91   BUN 14   CREATININE 0.71   CALCIUM 9.3     Recent Labs     01/09/21  0702   ALTSGPT 7   ASTSGOT 17   ALKPHOSPHAT 118*   TBILIRUBIN 0.4   GLUCOSE 91         No results for input(s): NTPROBNP in the last 72 hours.      Recent Labs     01/09/21  0702   TROPONINT 161*       Imaging:  DX-CHEST-PORTABLE (1 VIEW)   Final Result      No acute cardiac or pulmonary abnormality is noted.      CT-HEAD W/O   Final Result      1.  No acute intracranial process.      2.  Atrophy and moderate to severe small vessel ischemic change.      3.  Unchanged 11 mm meningioma on the left aspect of the superior falx.      EC-ECHOCARDIOGRAM COMPLETE W/O CONT    (Results Pending)   MR-BRAIN-W/O    (Results Pending)         Assessment/Plan:  I anticipate this patient is appropriate for observation status at this time.    * TIA (transient ischemic attack)  Assessment & Plan  Work-up with left hand weakness  Improved  CT scan did not show any signs of bleeding  MRI without contrast is pending  Echo is pending  The patient has history of A. Fib, continue aspirin at this time and consider small dose of Eliquis later.  PT and OT with a speech evaluation  Consult  for advanced directive    Chronic atrial fibrillation (HCC)- (present on admission)  Assessment & Plan  The patient had atrial fibrillation with no RVR  The patient does not take metoprolol or anticoagulation before  She is only on aspirin  Continue aspirin  Consider small dose of Eliquis later.    Heart murmur  Assessment & Plan  Patient has systolic heart murmur  Echo is pending    Dementia (HCC)- (present on admission)  Assessment & Plan  The patient has mild to moderate dementia, she was  alert and oriented x3 on admission however she is confused  The patient lives at the assisted living and she is not safe to live by herself  The patient high risk for delirium, continue nonpharmacological treatment and avoid benzo  Consult  for advanced directive  Prevent constipation or pain and early moving and discharge

## 2021-01-09 NOTE — ED TRIAGE NOTES
"Chief Complaint   Patient presents with   • Arm Pain     Left arm pain/feeling frozen at 0400 and subsiding around 0600       BIB EMS to R2 via im[facility]iaz, pt on monitor and in gown, labs drawn and sent. Pt consists of: coming from [facility], pt feeling her L arm having pain and feeling \"frozen\" per pt states that she wasn't able to move arm, still had sensation, denied SOB & CP, and felt very cold. S/s started going away around 0600, per EMS pt BG 89. Pt A&Ox3, disoriented to place, pt re-oriented. RA. Pt per EMS has hx of dementia. NAD noted.     Medications given en route:n/a    /101   Pulse 86   Temp 36.7 °C (98.1 °F) (Temporal)   Resp 18   Ht 1.676 m (5' 6\")   Wt 68 kg (150 lb)   SpO2 95%   BMI 24.21 kg/m²   "

## 2021-01-09 NOTE — ED NOTES
Tele RN in ER to  patient. Pt questioning why she is being admitted and is adamant that she is to go home, not be admitted to hospital. Attempt to explain reasoning to patient about admission to hospital, although she is unwilling to listen. Pt states she has not talked to the doctor or anyone about her care. Hospitalist called for assistance.

## 2021-01-09 NOTE — ASSESSMENT & PLAN NOTE
The patient had atrial fibrillation with no RVR  The patient does not take metoprolol or anticoagulation before  She is only on aspirin  Continue aspirin  Consider small dose of Eliquis later.

## 2021-01-09 NOTE — ED PROVIDER NOTES
"ED Provider Note    CHIEF COMPLAINT  Chief Complaint   Patient presents with   • Arm Pain     Left arm pain/feeling frozen at 0400 and subsiding around 0600       HPI  Sindy Lyons is a 89 y.o. female who presents with left hand paralysis.  She woke up this morning and could not move her left hand.  This was not any particular area of the hand.  States that hand felt cold.  This lasted for about an hour maybe 2 hours and then resolved.  She thinks maybe her speech was a bit off at the time, but is not sure.  Denies headache, other weakness numbness or neurologic symptoms.  Denies any ongoing symptoms.  She has not had any chest pain.  No shortness of breath, cough congestion runny nose sore throat or illness.  She denies associated symptoms    REVIEW OF SYSTEMS  As above  All other systems reviewed and are negative.     PAST MEDICAL HISTORY  Past Medical History:   Diagnosis Date   • Afib (HCC)    • CATARACT     removed       FAMILY HISTORY  History reviewed. No pertinent family history.    SOCIAL HISTORY  Social History     Tobacco Use   • Smoking status: Former Smoker     Years: 5.00   • Smokeless tobacco: Former User   • Tobacco comment: 25 years ago   Substance Use Topics   • Alcohol use: No     Frequency: Never   • Drug use: No       SURGICAL HISTORY  Past Surgical History:   Procedure Laterality Date   • GYN SURGERY      hysterectomy,   • OTHER      polynidal cyst removed.   • OTHER ABDOMINAL SURGERY      ada       CURRENT MEDICATIONS  Home Medications     Reviewed by Sandra Mcdermott R.N. (Registered Nurse) on 01/09/21 at 0700  Med List Status: Partial   Medication Last Dose Status   acetaminophen (TYLENOL) 325 MG Tab  Active   aspirin (ASA) 325 MG Tab  Active   CALCIUM PO  Active                ALLERGIES  No Known Allergies    PHYSICAL EXAM  VITAL SIGNS: /72   Pulse 65   Temp 36.8 °C (98.2 °F) (Temporal)   Resp (!) 25   Ht 1.676 m (5' 6\")   Wt 68 kg (150 lb)   SpO2 96%   BMI 24.21 kg/m² "   Constitutional: Awake and alert  HENT: Head atraumatic, ears normal inspection, oropharynx is benign with moist mucous membranes, nares clear  Eyes: Pupils equal round reactive, extraocular muscles are intact. No ptosis or miosis.  Neck: Neck is supple without meningismus. No JVD. No pain. No bruit is noted  Cardiovascular: Normal heart rate, Normal rhythm  Thorax & Lungs: Normal breath sounds, No respiratory distress, No wheezing, No chest tenderness.   Abdomen: Soft, No tenderness, No masses, No pulsatile masses.   Skin: No pathologic rash  Extremities: Intact distal pulses, No edema, No tenderness, No cyanosis, No clubbing.   Neurologic: Awake alert oriented.  Cranial nerves intact.  Occasionally slow and seems to have a hard time finding words but no significant aphasia.  No dysarthria.  5/5 strength in upper and lower extremities.  No ataxia.  No extinction or neglect.  No visual field cut.  NIH 1        RADIOLOGY/PROCEDURES  DX-CHEST-PORTABLE (1 VIEW)   Final Result      No acute cardiac or pulmonary abnormality is noted.      CT-HEAD W/O   Final Result      1.  No acute intracranial process.      2.  Atrophy and moderate to severe small vessel ischemic change.      3.  Unchanged 11 mm meningioma on the left aspect of the superior falx.           Imaging is interpreted by radiologist    LABS:  Results for orders placed or performed during the hospital encounter of 01/09/21   CBC WITH DIFFERENTIAL   Result Value Ref Range    WBC 5.6 4.8 - 10.8 K/uL    RBC 4.42 4.20 - 5.40 M/uL    Hemoglobin 14.1 12.0 - 16.0 g/dL    Hematocrit 43.8 37.0 - 47.0 %    MCV 99.1 (H) 81.4 - 97.8 fL    MCH 31.9 27.0 - 33.0 pg    MCHC 32.2 (L) 33.6 - 35.0 g/dL    RDW 51.8 (H) 35.9 - 50.0 fL    Platelet Count 206 164 - 446 K/uL    MPV 11.1 9.0 - 12.9 fL    Neutrophils-Polys 51.30 44.00 - 72.00 %    Lymphocytes 32.20 22.00 - 41.00 %    Monocytes 10.70 0.00 - 13.40 %    Eosinophils 4.70 0.00 - 6.90 %    Basophils 0.90 0.00 - 1.80 %     Immature Granulocytes 0.20 0.00 - 0.90 %    Nucleated RBC 0.00 /100 WBC    Neutrophils (Absolute) 2.87 2.00 - 7.15 K/uL    Lymphs (Absolute) 1.80 1.00 - 4.80 K/uL    Monos (Absolute) 0.60 0.00 - 0.85 K/uL    Eos (Absolute) 0.26 0.00 - 0.51 K/uL    Baso (Absolute) 0.05 0.00 - 0.12 K/uL    Immature Granulocytes (abs) 0.01 0.00 - 0.11 K/uL    NRBC (Absolute) 0.00 K/uL   COMP METABOLIC PANEL   Result Value Ref Range    Sodium 141 135 - 145 mmol/L    Potassium 3.9 3.6 - 5.5 mmol/L    Chloride 104 96 - 112 mmol/L    Co2 23 20 - 33 mmol/L    Anion Gap 14.0 7.0 - 16.0    Glucose 91 65 - 99 mg/dL    Bun 14 8 - 22 mg/dL    Creatinine 0.71 0.50 - 1.40 mg/dL    Calcium 9.3 8.5 - 10.5 mg/dL    AST(SGOT) 17 12 - 45 U/L    ALT(SGPT) 7 2 - 50 U/L    Alkaline Phosphatase 118 (H) 30 - 99 U/L    Total Bilirubin 0.4 0.1 - 1.5 mg/dL    Albumin 3.8 3.2 - 4.9 g/dL    Total Protein 7.3 6.0 - 8.2 g/dL    Globulin 3.5 1.9 - 3.5 g/dL    A-G Ratio 1.1 g/dL   TROPONIN   Result Value Ref Range    Troponin T 161 (H) 6 - 19 ng/L   ESTIMATED GFR   Result Value Ref Range    GFR If African American >60 >60 mL/min/1.73 m 2    GFR If Non African American >60 >60 mL/min/1.73 m 2   EKG (NOW)   Result Value Ref Range    Report       [facility]    Test Date:  2021  Pt Name:    ORTIZ GARCIA                  Department: ER  MRN:        [id number]                      Room:       RD 02  Gender:     Female                       Technician: [id number]  :        [id number]                   Requested By:CHETAN ALLEN  Order #:    [id number]                    Reading MD: CHETAN ALLEN MD    Measurements  Intervals                                Axis  Rate:       73                           P:  NJ:                                      QRS:        25  QRSD:       70                           T:          18  QT:         404  QTc:        446    Interpretive Statements  ATRIAL FIBRILLATION  LOW VOLTAGE THROUGHOUT  Compared to  ECG 01/10/2018 23:53:06  Low QRS voltage now present  Ventricular premature complex(es) no longer present  Electronically Signed On 1-9-2021 8:18:52 PST by CHETAN ALLEN MD         COURSE & MEDICAL DECISION MAKING  Patient presents with inability to move the hand.  Questionable minimal aphasia on exam.  No other neurologic findings.  Woke up with symptoms.  Not a candidate for alteplase or mechanical intervention.  Work-up was undertaken.    Data returned as above.  She has an elevated troponin of unclear significance.  This will need to be trended.  She needs stroke evaluation.  She was given aspirin and she will be admitted to the hospital..      FINAL IMPRESSION  1.  Left arm weakness  2.  Troponinosis  3.  Rule out cerebrovascular accident      This dictation was created using voice recognition software. The accuracy of the dictation is limited to the abilities of the software. I expect there may be some errors of grammar and possibly content. The nursing notes were reviewed and certain aspects of this information were incorporated into this note.      Electronically signed by: Chetan Allen M.D., 1/9/2021 8:16 AM

## 2021-01-09 NOTE — ED NOTES
Received report from VINCE Flores. Assumed care of patient. Patient sitting up in bed, awake & alert. RR even and unlabored. Pt aware of staff change, no needs at this time. Pt to xray.

## 2021-01-09 NOTE — ED NOTES
Pt sitting upright in bed, speaking without signs of distress noted. Regular respirations at rate of 18. Skin pink, warm, dry. No active bleeding noted. Pt refuse additional vital signs at this time.     Pt adamantly against remaining in hospital and son who is DPOA and present in room have discussed with hospitalist potential risk of not staying to be admitted several times. Pt states she is aware of potential risk and does not want to stay. Pt son states he is also aware of potential risk as discussed with hospitalist and is in agreement with patient signing out and going back to her living facility.     Pt assisted to son's vehicle via wheelchair.

## 2021-01-09 NOTE — ED NOTES
MD Forde aware of situation, at bedside. Pt still refusing to be admitted, states she wants to go home. Patient son, Nir, contacted. Nir to come to ER and speak with patient/hospitalist.

## 2021-01-09 NOTE — ASSESSMENT & PLAN NOTE
The patient has mild to moderate dementia, she was alert and oriented x3 on admission however she is confused  The patient lives at the assisted living and she is not safe to live by herself  The patient high risk for delirium, continue nonpharmacological treatment and avoid benzo  Consult  for advanced directive  Prevent constipation or pain and early moving and discharge

## 2021-01-09 NOTE — ED NOTES
Patient and son have decided pt will leave. Hospitalist MD Forde notified and will speak with son shortly. Report given to next shift nurseBrandon.

## 2021-01-09 NOTE — DISCHARGE PLANNING
Patient has Dementia will try and call sons.  I believe she lives at the [facility]

## 2021-01-09 NOTE — ASSESSMENT & PLAN NOTE
Work-up with left hand weakness  Improved  CT scan did not show any signs of bleeding  MRI without contrast is pending  Echo is pending  The patient has history of A. Fib, continue aspirin at this time and consider small dose of Eliquis later.  PT and OT with a speech evaluation  Consult  for advanced directive

## 2021-01-09 NOTE — ED NOTES
"Med rec complete per pt.   Per pt she \"used to be on a aspirin a day, but I've been off a year\".   Pt denies taking any medications.   Allergies reviewed  "

## 2021-05-25 NOTE — ED NOTES
"Pt BIB EMS from [facility].  Pt has dementia per EMS.  Pt has been getting more depressed d/t living situation.  Pt reported to staff at [facility] that \"she wanted to jump off the building or if It was too much she would cut her wrists.\"  Pt was refusing to come to the hospital with EMS and needed to give pt 3 mg of Versed to calm her down to get her to the hospital.  Pt Son id POA and aware that she is here.  Pt refusing to talk with staff on arrival to ER.  ERP to see.   "

## 2021-05-25 NOTE — ED NOTES
"The nursing director of the home that pt was at called. Informed that pt \"is good at saying the right thing at the right time, but that the patient was SI and that she had multiple ideas of how she would kill herself.\" the Nursing director is Michelle [phone]. Michelle also gave me the 's name and number: Alber [phone]. Michelle informed that they would like to be notified when the pt is going to be discharged so that they can come up with the proper care plan for when the pt returns home.   "

## 2021-05-25 NOTE — ED PROVIDER NOTES
"ED Provider  Scribed for Momo Darden D.O. by Antonia Henry. 5/25/2021  4:19 PM    Means of arrival: EMS  History obtained from: EMS/Nursing staff/Patient  History limited by: Dementia    CHIEF COMPLAINT  Chief Complaint   Patient presents with   • Depressed       HPI  Sindy Lyons is a 89 y.o. female with a history of dementia who presents to the [facility] ED via ambulance for depression onset today. The patient reports she is currently living at [facility], and had the  called on her for having the intention of committing suicide. She reports \"due to the circumstances, it is difficult not to be depressed, but I will keep fighting.\" Patient denies taking any antidepressant medications. Per nursing staff, the patient is unhappy with her living situation and told staff members at [facility] that she was going to \"jump off a building\" or \"slit her wrists if jumping off a building was too much.\" EMS was then called and brought the patient into the [facility] ED this afternoon for further evaluation. No alleviating or exacerbating factors were noted. Patient has associated suicidal ideation, but denies homicidal ideation. She does not smoke, drink alcohol, or use drugs. Patient has no known allergies and does not take any daily medications. Her PCP is Dr. Cisse.     No further details of history of present illness can be obtained within the constraints of urgency of the patient's clinical condition    HPI limited secondary due to: Dementia     REVIEW OF SYSTEMS  See HPI for further details. All other systems are negative.     ROS limited secondary due to: Dementia     PAST MEDICAL HISTORY   has a past medical history of Afib (HCC), CATARACT, and Heart murmur (1/9/2021).    SOCIAL HISTORY  Social History     Tobacco Use   • Smoking status: Former Smoker     Years: 5.00   • Smokeless tobacco: Former User   • Tobacco comment: 25 years ago   Vaping Use   • Vaping Use: Never used   Substance and Sexual " "Activity   • Alcohol use: No   • Drug use: No   • Sexual activity: Never       SURGICAL HISTORY   has a past surgical history that includes other abdominal surgery; gyn surgery; and other.    CURRENT MEDICATIONS  Home Medications    **Home medications have not yet been reviewed for this encounter**         ALLERGIES  No Known Allergies    PHYSICAL EXAM  VITAL SIGNS: /77   Pulse 80   Temp 36.7 °C (98 °F) (Oral)   Resp 16   Ht 1.676 m (5' 6\")   Wt 68 kg (150 lb)   SpO2 91%   BMI 24.21 kg/m²   Constitutional: Alert in no apparent distress.  HENT: No signs of trauma, mucous membranes are dry  Eyes: Conjunctiva normal, Non-icteric.   Neck: Normal range of motion, No tenderness, Supple.  Lymphatic: No lymphadenopathy noted.   Cardiovascular: Regular rate and rhythm, no murmurs.   Thorax & Lungs: Normal breath sounds, No respiratory distress, No wheezing, No chest tenderness.   Abdomen: Bowel sounds normal, Soft, No tenderness, No masses, No pulsatile masses. No peritoneal signs.  Skin: Warm, Dry, normal color.   Back: No bony tenderness, No CVA tenderness.   Extremities: No edema, No tenderness, No cyanosis  Musculoskeletal: Good range of motion in all major joints. No tenderness to palpation or major deformities noted.   Neurologic: Alert and oriented x4, Normal motor function, Normal sensory function, No focal deficits noted.   Psychiatric: Patient appears angry and states she is depressed, Patient states she will be safe and can take care of herself, Won't answer questions of orientation, Affect normal, Judgment normal    DIAGNOSTIC STUDIES / PROCEDURES    LABS  Results for orders placed or performed during the hospital encounter of 05/25/21   CBC WITH DIFFERENTIAL   Result Value Ref Range    WBC 7.5 4.8 - 10.8 K/uL    RBC 4.96 4.20 - 5.40 M/uL    Hemoglobin 15.3 12.0 - 16.0 g/dL    Hematocrit 46.6 37.0 - 47.0 %    MCV 94.0 81.4 - 97.8 fL    MCH 30.8 27.0 - 33.0 pg    MCHC 32.8 (L) 33.6 - 35.0 g/dL    RDW " 46.0 35.9 - 50.0 fL    Platelet Count 207 164 - 446 K/uL    MPV 11.6 9.0 - 12.9 fL    Neutrophils-Polys 54.80 44.00 - 72.00 %    Lymphocytes 31.00 22.00 - 41.00 %    Monocytes 9.30 0.00 - 13.40 %    Eosinophils 3.50 0.00 - 6.90 %    Basophils 1.10 0.00 - 1.80 %    Immature Granulocytes 0.30 0.00 - 0.90 %    Nucleated RBC 0.00 /100 WBC    Neutrophils (Absolute) 4.13 2.00 - 7.15 K/uL    Lymphs (Absolute) 2.33 1.00 - 4.80 K/uL    Monos (Absolute) 0.70 0.00 - 0.85 K/uL    Eos (Absolute) 0.26 0.00 - 0.51 K/uL    Baso (Absolute) 0.08 0.00 - 0.12 K/uL    Immature Granulocytes (abs) 0.02 0.00 - 0.11 K/uL    NRBC (Absolute) 0.00 K/uL   COMP METABOLIC PANEL   Result Value Ref Range    Sodium 141 135 - 145 mmol/L    Potassium 4.1 3.6 - 5.5 mmol/L    Chloride 106 96 - 112 mmol/L    Co2 24 20 - 33 mmol/L    Anion Gap 11.0 7.0 - 16.0    Glucose 103 (H) 65 - 99 mg/dL    Bun 15 8 - 22 mg/dL    Creatinine 0.84 0.50 - 1.40 mg/dL    Calcium 9.3 8.5 - 10.5 mg/dL    AST(SGOT) 37 12 - 45 U/L    ALT(SGPT) 28 2 - 50 U/L    Alkaline Phosphatase 304 (H) 30 - 99 U/L    Total Bilirubin 0.5 0.1 - 1.5 mg/dL    Albumin 3.9 3.2 - 4.9 g/dL    Total Protein 7.5 6.0 - 8.2 g/dL    Globulin 3.6 (H) 1.9 - 3.5 g/dL    A-G Ratio 1.1 g/dL   URINALYSIS CULTURE, IF INDICATED    Specimen: Blood   Result Value Ref Range    Color Yellow     Character Clear     Specific Gravity 1.020 <1.035    Ph 5.0 5.0 - 8.0    Glucose Negative Negative mg/dL    Ketones Negative Negative mg/dL    Protein Negative Negative mg/dL    Bilirubin Negative Negative    Urobilinogen, Urine 1.0 Negative    Nitrite Negative Negative    Leukocyte Esterase Trace (A) Negative    Occult Blood Negative Negative    Micro Urine Req Microscopic    DIAGNOSTIC ALCOHOL   Result Value Ref Range    Diagnostic Alcohol <10.1 0.0 - 10.0 mg/dL   URINE DRUG SCREEN   Result Value Ref Range    Amphetamines Urine Negative Negative    Barbiturates Negative Negative    Benzodiazepines Positive (A) Negative     "Cocaine Metabolite Negative Negative    Methadone Negative Negative    Opiates Negative Negative    Oxycodone Negative Negative    Phencyclidine -Pcp Negative Negative    Propoxyphene Negative Negative    Cannabinoid Metab Negative Negative   URINE MICROSCOPIC (W/UA)   Result Value Ref Range    WBC 2-5 /hpf    Bacteria Negative None /hpf    Epithelial Cells Moderate (A) /hpf    Hyaline Cast 3-5 (A) /lpf   ESTIMATED GFR   Result Value Ref Range    GFR If African American >60 >60 mL/min/1.73 m 2    GFR If Non African American >60 >60 mL/min/1.73 m 2       All labs reviewed by me.    COURSE  Pertinent Labs & Imaging studies reviewed. (See chart for details)    4:19 PM - Patient seen and examined at bedside. Discussed plan of care with patient. I informed them that labs will be ordered to evaluate symptoms. The patient is understanding and agreeable with plan of care.  The patient will be medicated with 1,000 mL NS Infusion. Ordered for CBC w/ Differential, CMP, UA Culture If Indicated, Diagnostic Alcohol, and Urine Drug Screen to evaluate her symptoms.     5:33 PM Patient was reevaluated at bedside. He is more compliant and is confused of recent events. Patient is now allowing blood work to be done.     5:55 PM Per nursing staff the patient is no longer suicidal and is requesting to see her son. She reports she is going to live till the age of 89 \"whether you all like it or not.\" Patient states she has been experiencing urinary frequency. She was told more labs will be ordered to evaluate her urine. The patient is understanding and agreeable to the plan of care.     1900 Patient was reevaluated at bedside. Discussed lab  and radiology  results with the patient and informed them that should be discharged home.     HYDRATION: Based on the patient's presentation of Dehydration the patient was given IV fluids. IV Hydration was used because oral hydration was not adequate alone. Upon recheck following hydration, the patient " was improved.    MEDICAL DECISION MAKING  This is a 89 y.o. female who presents with complaints of a suicidal ideation.  She threatened to jump off a building.  Patient was very agitated angry and not communicative when she first arrived.  She did soften up and she was communicative and happy.  She has poor memory of recent events consistent with her dementia.  She is not suicidal.  She states that she has reasons to live including her children and grandchildren she is going to live to be 89 years old despite anything we do to her.    At that the patient does not require admission and she is stable for follow-up with her primary physicians      Discharged to previous facility    FINAL IMPRESSION  1. Suicidal ideation    2. Dementia with behavioral disturbance, unspecified dementia type (HCC)         Antonia GUZMAN (Scribe), am scribing for, and in the presence of, Momo Dadren D.O..    Electronically signed by: Antonia Henry (Bozenaibsara), 5/25/2021    IMomo D.O. personally performed the services described in this documentation, as scribed by Antonia Henry in my presence, and it is both accurate and complete. C    The note accurately reflects work and decisions made by me.  Momo Darden D.O.  5/25/2021  7:41 PM

## 2021-05-26 NOTE — ED NOTES
"Dr. Darden spoke with Michelle, Nursing Director at [facility] regarding pt \" is not a harm/ danger to self\" . Michelle requesting a written note. Dr. Darden has provided hand written note and dc instructions as such for blair Kulkarni  working on transportation back to [facility].  "
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
## 2021-05-26 NOTE — ED NOTES
Report from Bossman CLARKE. Assumed care of patient at this time. Pt in room, wondering what is going on, asking when she can leave, asking if her sons have been called. She is distressed. Explained ER process and that she is up for d/c however we are waiting for transport back to where she lives. She does not understand but is agreeable to plan. Provided her with food and juice at this time.

## 2021-05-26 NOTE — ED NOTES
Had a lengthy conversation with family at bedside at this time. They are concerned that the pt was not properly evaluated and that she is telling lies and denying what they believe to be true. Explained that pt was sent here for SI, and has been screened by the ERP and the alert team and has denied these ideations. She was cleared medically, and is safe for return to the facility she arrived from. Showed family MD note and alert RN notes stating these things.     They are in the room arguing with pt, who is becoming agitated and confused. They are waiting on a third family member to arrive and will transport pt back to facility for follow up with PCP and return to previous care.     Family verbalizes understanding of d/c instructions and medical decisions made. Safe for d/c when family arrives.

## 2021-06-22 NOTE — ED NOTES
"Unable to update med rec since pt is up for [place]. Per Pravin Faulkner, pt was transferred to facility on 6/17/21 from [facility]. [facility] has not given medications to pt since they \"did not receive med info from [facility].\"   I reached out to [facility], information was sent to [facility] per RN. Charge RN stated that per pt's family, they have been refusing for pt to be on medications and pt has refused in multiple occassions to take medications during stay.    Pt's meds consist of:  Depakote 125 mg TID  Seroquel 75 mg with Dinner  Seroquel 12.5 mg with breakfast and 12.5 mg with lunch  Aspirin 81 mg daily  Lipitor 20 mg daily    Last doses were given to pt at [facility] on 6/16/21  "

## 2021-06-22 NOTE — ED PROVIDER NOTES
"ED Provider Note    CHIEF COMPLAINT  Chief Complaint   Patient presents with   • Rectal Pain     Pt bib EMS from a long term care facility. Pt has alzheimers/dementia. Only A&Ox1. Pt was being cleaned up by a caregiver this morning and the caregiver noticed some rectal bleeding. Pt was in a lot of pain and crying at that time. Pt is unable to answer most questions, she is reciting scripture at this time and stating, \"my bottom,\" repeatedly.        HPI  Sindy Lyons is a 89 y.o. female who presents for evaluation of a small amount of rectal bleeding.  The patient has a history of advanced dementia.  She has had a memory care facility.  Apparently she was having her diaper changed and there is a small amount of blood around her diaper.  Patient cannot provide any meaningful history.  Apparently due to fall risk the patient is not on any systemic anticoagulants just a baby aspirin.  She cannot provide any meaningful history.  Chart review demonstrates no known or confirm history of significant GI bleeding    REVIEW OF SYSTEMS  See HPI for further details.  Limited review of systems due to dementia all other systems are negative.     PAST MEDICAL HISTORY  Past Medical History:   Diagnosis Date   • Heart murmur 1/9/2021   • Afib (HCC)    • CATARACT     removed       FAMILY HISTORY  Noncontributory    SOCIAL HISTORY  Social History     Socioeconomic History   • Marital status: Single     Spouse name: Not on file   • Number of children: Not on file   • Years of education: Not on file   • Highest education level: Not on file   Occupational History   • Not on file   Tobacco Use   • Smoking status: Former Smoker     Years: 5.00   • Smokeless tobacco: Former User   • Tobacco comment: 25 years ago   Vaping Use   • Vaping Use: Never used   Substance and Sexual Activity   • Alcohol use: No   • Drug use: No   • Sexual activity: Never   Other Topics Concern   • Not on file   Social History Narrative    Resides in assisted living " "facility.      Social Determinants of Health     Financial Resource Strain:    • Difficulty of Paying Living Expenses:    Food Insecurity: No Food Insecurity   • Worried About Running Out of Food in the Last Year: Never true   • Ran Out of Food in the Last Year: Never true   Transportation Needs: No Transportation Needs   • Lack of Transportation (Medical): No   • Lack of Transportation (Non-Medical): No   Physical Activity:    • Days of Exercise per Week:    • Minutes of Exercise per Session:    Stress:    • Feeling of Stress :    Social Connections:    • Frequency of Communication with Friends and Family:    • Frequency of Social Gatherings with Friends and Family:    • Attends Congregational Services:    • Active Member of Clubs or Organizations:    • Attends Club or Organization Meetings:    • Marital Status:    Intimate Partner Violence:    • Fear of Current or Ex-Partner:    • Emotionally Abused:    • Physically Abused:    • Sexually Abused:        SURGICAL HISTORY  Past Surgical History:   Procedure Laterality Date   • GYN SURGERY      hysterectomy,   • OTHER      polynidal cyst removed.   • OTHER ABDOMINAL SURGERY      ada       CURRENT MEDICATIONS  Home Medications     Reviewed by Wanda Milan R.N. (Registered Nurse) on 06/22/21 at 1107  Med List Status: Not Addressed   Medication Last Dose Status   acetaminophen (TYLENOL) 325 MG Tab  Active   aspirin (ASA) 81 MG Chew Tab chewable tablet  Active   atorvastatin (LIPITOR) 20 MG Tab  Active                ALLERGIES  No Known Allergies    PHYSICAL EXAM  VITAL SIGNS: /80   Pulse 60   Temp 36.4 °C (97.6 °F) (Temporal)   Resp 16   Ht 1.676 m (5' 6\")   Wt 68 kg (150 lb)   SpO2 94%   BMI 24.21 kg/m²       Constitutional: Well developed, Well nourished, No acute distress, Non-toxic appearance.   HENT: Normocephalic, Atraumatic, Bilateral external ears normal, Oropharynx moist, No oral exudates, Nose normal.   Eyes: PERRLA, EOMI, Conjunctiva normal, No " discharge.   Neck: Normal range of motion, No tenderness, Supple, No stridor.   Cardiovascular: Normal heart rate, Normal rhythm, No murmurs, No rubs, No gallops.   Thorax & Lungs: Normal breath sounds, No respiratory distress, No wheezing, No chest tenderness.   Abdomen: Bowel sounds normal, Soft, No tenderness, No masses, No pulsatile masses.   Skin: Warm, Dry, No erythema, No rash.   Back: No tenderness, No CVA tenderness.   Rectal: Normal appearance, there is a small amount of dried blood on the diaper.  There are no perianal lesions no active bleeding.  Rectal exam demonstrates no blood.  Extremities: Intact distal pulses, No edema, No tenderness, No cyanosis, No clubbing.   Neurologic: Oriented to self only secondary to dementia moving all extremities no focal neurological deficits other than chronic dementia    Results for orders placed or performed during the hospital encounter of 06/22/21   CBC WITH DIFFERENTIAL   Result Value Ref Range    WBC 6.8 4.8 - 10.8 K/uL    RBC 4.94 4.20 - 5.40 M/uL    Hemoglobin 15.6 12.0 - 16.0 g/dL    Hematocrit 47.3 (H) 37.0 - 47.0 %    MCV 95.7 81.4 - 97.8 fL    MCH 31.6 27.0 - 33.0 pg    MCHC 33.0 (L) 33.6 - 35.0 g/dL    RDW 47.9 35.9 - 50.0 fL    Platelet Count 226 164 - 446 K/uL    MPV 11.9 9.0 - 12.9 fL    Neutrophils-Polys 66.20 44.00 - 72.00 %    Lymphocytes 20.70 (L) 22.00 - 41.00 %    Monocytes 10.60 0.00 - 13.40 %    Eosinophils 1.50 0.00 - 6.90 %    Basophils 0.60 0.00 - 1.80 %    Immature Granulocytes 0.40 0.00 - 0.90 %    Nucleated RBC 0.00 /100 WBC    Neutrophils (Absolute) 4.48 2.00 - 7.15 K/uL    Lymphs (Absolute) 1.40 1.00 - 4.80 K/uL    Monos (Absolute) 0.72 0.00 - 0.85 K/uL    Eos (Absolute) 0.10 0.00 - 0.51 K/uL    Baso (Absolute) 0.04 0.00 - 0.12 K/uL    Immature Granulocytes (abs) 0.03 0.00 - 0.11 K/uL    NRBC (Absolute) 0.00 K/uL   Comp Metabolic Panel   Result Value Ref Range    Sodium 138 135 - 145 mmol/L    Potassium 4.3 3.6 - 5.5 mmol/L    Chloride  105 96 - 112 mmol/L    Co2 22 20 - 33 mmol/L    Anion Gap 11.0 7.0 - 16.0    Glucose 93 65 - 99 mg/dL    Bun 12 8 - 22 mg/dL    Creatinine 0.75 0.50 - 1.40 mg/dL    Calcium 9.2 8.5 - 10.5 mg/dL    AST(SGOT) 26 12 - 45 U/L    ALT(SGPT) 16 2 - 50 U/L    Alkaline Phosphatase 119 (H) 30 - 99 U/L    Total Bilirubin 0.9 0.1 - 1.5 mg/dL    Albumin 3.5 3.2 - 4.9 g/dL    Total Protein 7.4 6.0 - 8.2 g/dL    Globulin 3.9 (H) 1.9 - 3.5 g/dL    A-G Ratio 0.9 g/dL   ESTIMATED GFR   Result Value Ref Range    GFR If African American >60 >60 mL/min/1.73 m 2    GFR If Non African American >60 >60 mL/min/1.73 m 2        COURSE & MEDICAL DECISION MAKING  Pertinent Labs & Imaging studies reviewed. (See chart for details)  Patient was observed for several hours.  She does not have any signs of hypotension or tachycardia.  Clinically she appears well.  CBC metabolic panel do not demonstrate any acute blood loss.  Hemoglobin is actually high normal.  She was observed and does not have any ongoing bleeding.  I feel that she is safe to go back to her memory care facility and return as needed for new or worsening symptoms    FINAL IMPRESSION  1.  Hematochezia         Electronically signed by: Min Lamar M.D., 6/22/2021 11:41 AM

## 2021-06-22 NOTE — DISCHARGE PLANNING
Monse at [facility] notified Pt is returning via [facility]    PCS completed and faxed to [facility]  Transportation time arranged for 1430    Transfer Packet completed and placed with Pt chart.   RN notified of transfer and transfer time.

## 2021-06-22 NOTE — ED NOTES
Pt taken by SHAREE back to long term care facility with packet. Pt has all belongings/clothing that she came in with. Pt is still A&Ox1, report given to SHAREE. IV removed.

## 2021-06-22 NOTE — ED TRIAGE NOTES
"Chief Complaint   Patient presents with   • Rectal Pain     Pt bib EMS from a long term care facility. Pt has alzheimers/dementia. Only A&Ox1. Pt was being cleaned up by a caregiver this morning and the caregiver noticed some rectal bleeding. Pt was in a lot of pain and crying at that time. Pt is unable to answer most questions, she is reciting scripture at this time and stating, \"my bottom,\" repeatedly.      /80   Pulse 60   Temp 36.4 °C (97.6 °F) (Temporal)   Resp 16   Ht 1.676 m (5' 6\")   Wt 68 kg (150 lb)   SpO2 94%   BMI 24.21 kg/m²     "

## 2021-06-22 NOTE — DISCHARGE PLANNING
JOSHUA notified by RN Pt is medically cleared.     JOSHUA spoke to Monse at [facility]- she stated no transportation , stated Pt should come back by [facility].     JOSHUA left VM with Pt son Nir Luevano [phone] to see if they prefer [facility] or if they provide transportation as a family.     JOSHUA will arrange [facility] at this time unless family calls.

## 2021-07-30 NOTE — ED PROVIDER NOTES
ED Provider Note    CHIEF COMPLAINT  Chief Complaint   Patient presents with   • Dehydration     BIB EMS for possible dehydration. Family states that Pt has not been eating or drinking recently and they are worried about abnormal labs. Pt complains of no issues. Hx of dementia.       HPI  Sindy Lyons is a 89 y.o. female with a longstanding history of severe dementia.  Patient apparently has had some decreased p.o. intake and family is concerned that she might be dehydrated.  Patient has no complaints.  Patient has a longstanding history of dementia and is unable to provide any history secondary to her chronic confusion    REVIEW OF SYSTEMS  ROS  Unable to perform review of systems secondary to patient's chronic confusion    PAST MEDICAL HISTORY   has a past medical history of Afib (HCC), CATARACT, and Heart murmur (1/9/2021).    SOCIAL HISTORY  Social History     Tobacco Use   • Smoking status: Former Smoker     Years: 5.00   • Smokeless tobacco: Former User   • Tobacco comment: 25 years ago   Vaping Use   • Vaping Use: Never used   Substance and Sexual Activity   • Alcohol use: No   • Drug use: No   • Sexual activity: Never       SURGICAL HISTORY   has a past surgical history that includes other abdominal surgery; gyn surgery; and other.    CURRENT MEDICATIONS  Home Medications    **Home medications have not yet been reviewed for this encounter**         ALLERGIES  No Known Allergies    PHYSICAL EXAM  Vitals:    07/30/21 1334   BP: 137/68   Pulse: 67   Resp: 18   Temp: 36.3 °C (97.4 °F)   SpO2: 93%       Physical Exam  Constitutional:       Appearance: She is well-developed.   HENT:      Head: Normocephalic and atraumatic.   Eyes:      Conjunctiva/sclera: Conjunctivae normal.   Cardiovascular:      Rate and Rhythm: Normal rate and regular rhythm.   Pulmonary:      Effort: Pulmonary effort is normal.      Breath sounds: Normal breath sounds.   Abdominal:      General: Bowel sounds are normal. There is no distension.       Palpations: Abdomen is soft.      Tenderness: There is no abdominal tenderness. There is no rebound.   Musculoskeletal:      Cervical back: Normal range of motion and neck supple.   Skin:     General: Skin is warm and dry.      Findings: No rash.   Neurological:      Mental Status: She is alert.      Comments: a0x1   Psychiatric:         Behavior: Behavior normal.       Results for orders placed or performed during the hospital encounter of 07/30/21   CBC WITH DIFFERENTIAL   Result Value Ref Range    WBC 4.8 4.8 - 10.8 K/uL    RBC 5.02 4.20 - 5.40 M/uL    Hemoglobin 15.8 12.0 - 16.0 g/dL    Hematocrit 48.2 (H) 37.0 - 47.0 %    MCV 96.0 81.4 - 97.8 fL    MCH 31.5 27.0 - 33.0 pg    MCHC 32.8 (L) 33.6 - 35.0 g/dL    RDW 48.6 35.9 - 50.0 fL    Platelet Count 170 164 - 446 K/uL    MPV 11.9 9.0 - 12.9 fL    Neutrophils-Polys 56.80 44.00 - 72.00 %    Lymphocytes 30.80 22.00 - 41.00 %    Monocytes 10.60 0.00 - 13.40 %    Eosinophils 0.60 0.00 - 6.90 %    Basophils 0.80 0.00 - 1.80 %    Immature Granulocytes 0.40 0.00 - 0.90 %    Nucleated RBC 0.00 /100 WBC    Neutrophils (Absolute) 2.74 2.00 - 7.15 K/uL    Lymphs (Absolute) 1.49 1.00 - 4.80 K/uL    Monos (Absolute) 0.51 0.00 - 0.85 K/uL    Eos (Absolute) 0.03 0.00 - 0.51 K/uL    Baso (Absolute) 0.04 0.00 - 0.12 K/uL    Immature Granulocytes (abs) 0.02 0.00 - 0.11 K/uL    NRBC (Absolute) 0.00 K/uL   CMP   Result Value Ref Range    Sodium 140 135 - 145 mmol/L    Potassium 4.4 3.6 - 5.5 mmol/L    Chloride 101 96 - 112 mmol/L    Co2 26 20 - 33 mmol/L    Anion Gap 13.0 7.0 - 16.0    Glucose 72 65 - 99 mg/dL    Bun 17 8 - 22 mg/dL    Creatinine 0.66 0.50 - 1.40 mg/dL    Calcium 9.0 8.5 - 10.5 mg/dL    AST(SGOT) 42 12 - 45 U/L    ALT(SGPT) 23 2 - 50 U/L    Alkaline Phosphatase 96 30 - 99 U/L    Total Bilirubin 0.9 0.1 - 1.5 mg/dL    Albumin 3.6 3.2 - 4.9 g/dL    Total Protein 6.8 6.0 - 8.2 g/dL    Globulin 3.2 1.9 - 3.5 g/dL    A-G Ratio 1.1 g/dL   ESTIMATED GFR   Result  Value Ref Range    GFR If African American >60 >60 mL/min/1.73 m 2    GFR If Non African American >60 >60 mL/min/1.73 m 2   VALPROIC ACID   Result Value Ref Range    Valproic Acid 27.2 (L) 50.0 - 100.0 ug/mL         CT-HEAD W/O   Final Result      1.  No acute intracranial abnormality is identified.   2.  Atrophy   3.  There are periventricular and subcortical white matter changes present.  This finding is nonspecific and could be from previous small vessel ischemia, demyelination, or gliosis.      '    COURSE & MEDICAL DECISION MAKING  Pertinent Labs & Imaging studies reviewed. (See chart for details)    Very well-appearing patient here with what appears to be her baseline mental status.  She does not have any evidence of head trauma on exam.  Patient with family is not answering, I have tried to call to try to elucidate why she was sent here.  Patient does not have any issues and has a entirely unremarkable exam at this point outside of her findings consistent with chronic dementia.  Apparently patient was sent here to see if she had any abnormal labs, will check CBC CMP.  Patient's basic labs are unremarkable.  Patient's case discussed with her family, they were concerned that she may have toxic levels of valproic acid that she has on divalproex at the nursing facility for history of seizures.  I will check this though I believe this is unlikely.  I believe her symptoms are likely from ongoing and progressively worsening dementia.  Also check CT head.  Patient's abdominal exam is entirely benign, I believe surgical pathology assess is highly unlikely.  Patient valproic acid is actually low.  Family reports that the PA who takes care of patient was planning on stopping this medication anyway and therefore I will not load at this point, patient without any recent seizures.      FINAL IMPRESSION    1. Dementia without behavioral disturbance, unspecified dementia type (HCC)             Electronically signed by: Chuckie  RENAE Rutledge M.D., 7/30/2021 2:03 PM

## 2021-07-30 NOTE — ED TRIAGE NOTES
Chief Complaint   Patient presents with   • Dehydration     BIB EMS for possible dehydration. Family states that Pt has not been eating or drinking recently and they are worried about abnormal labs. Pt complains of no issues. Hx of dementia.

## 2021-07-30 NOTE — DISCHARGE INSTRUCTIONS
Labs including CBC, CMP failed to reveal any acute abnormality.  No evidence of kidney function disruption, liver issues or elevated white count.

## 2021-07-31 NOTE — ED NOTES
"Pt becoming agitated and questioning RN as to why she is in the hospital. Pt states she just wants to go home and that there's nothing wrong with her. When informed why Pt's children are worried about her, Pt stated \"well if they wanted me to just eat and drink, they should've just said so and I would do it\". Pt question RN as to why they think \"her brain is so special\". Pt informed that treatment team was worried about changes in her mental status and how she was unsure of where she was, what month and year it was, as well as why she was in the hospital. Pt stated she wasn't confused and that we should go scan other people's heads instead.  "

## 2021-07-31 NOTE — ED NOTES
"When attempting to obtain a urine specimen via straight cath, Pt refused stating \"I'll just come back tomorrow.\" When trying to inform Pt that we needed the urine sample today, Pt kept insisting that she talk with her doctor to see why she needed it done. When informed that it was the ERP that requested the straight cath, Pt demanded to speak to the ERP. Informed Pt that ERP is currently busy with other Pt's but when he steps back into the room, she may discuss the issue with them. Urine was not collected due to Pt refusal. Informed ERP of situation. Decision made to discharge Pt. When attempting to contact family in regards to how Pt was going to get back to [facility], Pt's son stated that they were worried about possible dilantin poisoning. Decision changed to keep Pt for the time being to perform additional blood tests and imaging. When RN re-entered room, Pt kept muttering to herself how people only do what the doctor wants and not what the Pt wants. When RN asked Pt if she knew why she was in the hospital, Pt stated she did not know. Pt informed that her children are worried about her health due to Pt not eating or drinking, stating everything tastes terrible, and throwing up after eating. Blood drawn and sent down to lab.  "

## 2021-07-31 NOTE — ED NOTES
When CT tech attempted to take Pt to CT, Pt refused. RN explained to Pt the reasoning why we wanted the imaging performed. RN was also speaking to Pt's son on th phone at the time. Asked if Pt would like to speak with son briefly. Pt accepted. Pt's son also informed Pt as to why a CT scan was requested. Pt agreed to have imaging done. Pt transported to CT and back with RN.

## 2021-07-31 NOTE — ED NOTES
Called Pt's son to inform them that Pt will be discharged shortly and that she is ready to be picked up. Pt's son stated they will be here in 15-20 min.

## 2021-07-31 NOTE — ED NOTES
Pt's son at bedside to  Pt. Pt's son informed of clinical findings and ERP decision for discharge. Pt's son provided with discharge instructions. Pt and Pt's son had no further questions. Pt's son signed discharge paperwork for Pt due to Pt's inability to sign. Pt wheel chaired out to lobby.

## 2024-06-17 NOTE — ASSESSMENT & PLAN NOTE
- EKG showed atrial fibrillation  - [id number] +3  - Continue asa 325 mg daily   - Patient is a poor candidate for OAC due to history of frequent falls  
- Patient noted to have a leukopenia to 2.4 with ANC of 0.86, currently trending up  - Slight thrombocytopenia noted to 140, no signs of anemia  - Reticulocyte count appropriate  - Possibly due to viral infection causing bone marrow suppression, further workup/monitoring likely required  - Patient refuses to stay for further workup, will provide script for repeat CBC in 1 week  
- Patient noted to have nystagmus on PE, fatigability noted  - No other focal neurological deficits noted, cranial nerve exam grossly normal  - Likely peripheral nystagmus, however recent history of falls is concerning for central nystagmus  - MRI/MRA ordered, patient refused as inpatient, strongly recommend as outpatient  
- history of mechanical falls, no fractures hx  - CT head negative  - PT/OT  
- likely due to deconditioning, poor PO intake   - UA suggestive of infection, however denies urinary symptoms    - orthostatics negative  - PT/OT recommend patient able to be discharged home  
The patient is a 41y Female complaining of aggressive behavior.